# Patient Record
Sex: FEMALE | Race: WHITE | NOT HISPANIC OR LATINO | Employment: UNEMPLOYED | URBAN - METROPOLITAN AREA
[De-identification: names, ages, dates, MRNs, and addresses within clinical notes are randomized per-mention and may not be internally consistent; named-entity substitution may affect disease eponyms.]

---

## 2021-06-03 ENCOUNTER — RECORDS - HEALTHEAST (OUTPATIENT)
Dept: ADMINISTRATIVE | Facility: CLINIC | Age: 51
End: 2021-06-03

## 2023-08-10 ENCOUNTER — TELEPHONE (OUTPATIENT)
Dept: BEHAVIORAL HEALTH | Facility: CLINIC | Age: 53
End: 2023-08-10

## 2023-08-10 ENCOUNTER — HOSPITAL ENCOUNTER (INPATIENT)
Facility: CLINIC | Age: 53
LOS: 7 days | Discharge: PSYCHIATRIC OR SUBSTANCE USE FACILITY | End: 2023-08-23
Attending: FAMILY MEDICINE | Admitting: PSYCHIATRY & NEUROLOGY
Payer: MEDICAID

## 2023-08-10 DIAGNOSIS — G47.00 INSOMNIA, UNSPECIFIED TYPE: ICD-10-CM

## 2023-08-10 DIAGNOSIS — G25.81 RESTLESS LEGS SYNDROME: ICD-10-CM

## 2023-08-10 DIAGNOSIS — R06.02 SOB (SHORTNESS OF BREATH): ICD-10-CM

## 2023-08-10 DIAGNOSIS — F32.A DEPRESSION, UNSPECIFIED DEPRESSION TYPE: ICD-10-CM

## 2023-08-10 DIAGNOSIS — R45.850 HOMICIDAL IDEATION: ICD-10-CM

## 2023-08-10 DIAGNOSIS — R45.851 SUICIDAL IDEATION: ICD-10-CM

## 2023-08-10 DIAGNOSIS — F32.A DEPRESSION: ICD-10-CM

## 2023-08-10 DIAGNOSIS — E56.9 VITAMIN DEFICIENCY: ICD-10-CM

## 2023-08-10 DIAGNOSIS — K59.09 OTHER CONSTIPATION: ICD-10-CM

## 2023-08-10 DIAGNOSIS — Z20.822 CONTACT WITH AND (SUSPECTED) EXPOSURE TO COVID-19: Primary | ICD-10-CM

## 2023-08-10 DIAGNOSIS — B00.9 HERPES SIMPLEX VIRUS INFECTION: ICD-10-CM

## 2023-08-10 LAB
ALCOHOL BREATH TEST: 0.01 (ref 0–0.01)
AMPHETAMINES UR QL SCN: ABNORMAL
BARBITURATES UR QL SCN: ABNORMAL
BENZODIAZ UR QL SCN: ABNORMAL
BZE UR QL SCN: ABNORMAL
CANNABINOIDS UR QL SCN: ABNORMAL
OPIATES UR QL SCN: ABNORMAL

## 2023-08-10 PROCEDURE — 80307 DRUG TEST PRSMV CHEM ANLYZR: CPT | Performed by: FAMILY MEDICINE

## 2023-08-10 PROCEDURE — 99285 EMERGENCY DEPT VISIT HI MDM: CPT | Performed by: FAMILY MEDICINE

## 2023-08-10 PROCEDURE — 99285 EMERGENCY DEPT VISIT HI MDM: CPT | Mod: 25 | Performed by: FAMILY MEDICINE

## 2023-08-10 PROCEDURE — 90791 PSYCH DIAGNOSTIC EVALUATION: CPT

## 2023-08-10 PROCEDURE — 81001 URINALYSIS AUTO W/SCOPE: CPT | Performed by: FAMILY MEDICINE

## 2023-08-10 RX ORDER — HYDROXYZINE HYDROCHLORIDE 25 MG/1
25-50 TABLET, FILM COATED ORAL EVERY 6 HOURS PRN
Status: DISCONTINUED | OUTPATIENT
Start: 2023-08-10 | End: 2023-08-23 | Stop reason: HOSPADM

## 2023-08-10 RX ORDER — BUPROPION HYDROCHLORIDE 150 MG/1
150 TABLET, EXTENDED RELEASE ORAL DAILY
Status: DISCONTINUED | OUTPATIENT
Start: 2023-08-11 | End: 2023-08-23 | Stop reason: HOSPADM

## 2023-08-10 RX ORDER — QUETIAPINE FUMARATE 25 MG/1
25 TABLET, FILM COATED ORAL 2 TIMES DAILY PRN
Status: DISCONTINUED | OUTPATIENT
Start: 2023-08-10 | End: 2023-08-10

## 2023-08-10 RX ORDER — QUETIAPINE FUMARATE 100 MG/1
100 TABLET, FILM COATED ORAL AT BEDTIME
Status: DISCONTINUED | OUTPATIENT
Start: 2023-08-10 | End: 2023-08-15

## 2023-08-10 RX ORDER — ROPINIROLE 0.25 MG/1
0.25 TABLET, FILM COATED ORAL AT BEDTIME
Status: DISCONTINUED | OUTPATIENT
Start: 2023-08-10 | End: 2023-08-10

## 2023-08-10 RX ORDER — GABAPENTIN 300 MG/1
300 CAPSULE ORAL AT BEDTIME
Status: DISCONTINUED | OUTPATIENT
Start: 2023-08-10 | End: 2023-08-23 | Stop reason: HOSPADM

## 2023-08-10 ASSESSMENT — ACTIVITIES OF DAILY LIVING (ADL)
ADLS_ACUITY_SCORE: 35
ADLS_ACUITY_SCORE: 35

## 2023-08-11 ENCOUNTER — TELEPHONE (OUTPATIENT)
Dept: BEHAVIORAL HEALTH | Facility: CLINIC | Age: 53
End: 2023-08-11

## 2023-08-11 LAB
ALBUMIN SERPL BCG-MCNC: 3.9 G/DL (ref 3.5–5.2)
ALBUMIN UR-MCNC: NEGATIVE MG/DL
ALP SERPL-CCNC: 59 U/L (ref 35–104)
ALT SERPL W P-5'-P-CCNC: 26 U/L (ref 0–50)
ANION GAP SERPL CALCULATED.3IONS-SCNC: 8 MMOL/L (ref 7–15)
APPEARANCE UR: CLEAR
AST SERPL W P-5'-P-CCNC: 25 U/L (ref 0–45)
BASOPHILS # BLD AUTO: 0.1 10E3/UL (ref 0–0.2)
BASOPHILS NFR BLD AUTO: 1 %
BILIRUB SERPL-MCNC: 0.3 MG/DL
BILIRUB UR QL STRIP: NEGATIVE
BUN SERPL-MCNC: 15 MG/DL (ref 6–20)
CALCIUM SERPL-MCNC: 9.2 MG/DL (ref 8.6–10)
CHLORIDE SERPL-SCNC: 105 MMOL/L (ref 98–107)
COLOR UR AUTO: ABNORMAL
CREAT SERPL-MCNC: 1.02 MG/DL (ref 0.51–0.95)
DEPRECATED HCO3 PLAS-SCNC: 29 MMOL/L (ref 22–29)
EOSINOPHIL # BLD AUTO: 0.3 10E3/UL (ref 0–0.7)
EOSINOPHIL NFR BLD AUTO: 5 %
ERYTHROCYTE [DISTWIDTH] IN BLOOD BY AUTOMATED COUNT: 14.5 % (ref 10–15)
GFR SERPL CREATININE-BSD FRML MDRD: 66 ML/MIN/1.73M2
GLUCOSE SERPL-MCNC: 58 MG/DL (ref 70–99)
GLUCOSE UR STRIP-MCNC: NEGATIVE MG/DL
HCT VFR BLD AUTO: 42.6 % (ref 35–47)
HGB BLD-MCNC: 13.9 G/DL (ref 11.7–15.7)
HGB UR QL STRIP: NEGATIVE
IMM GRANULOCYTES # BLD: 0 10E3/UL
IMM GRANULOCYTES NFR BLD: 0 %
KETONES UR STRIP-MCNC: NEGATIVE MG/DL
LEUKOCYTE ESTERASE UR QL STRIP: ABNORMAL
LYMPHOCYTES # BLD AUTO: 3.6 10E3/UL (ref 0.8–5.3)
LYMPHOCYTES NFR BLD AUTO: 50 %
MCH RBC QN AUTO: 30.3 PG (ref 26.5–33)
MCHC RBC AUTO-ENTMCNC: 32.6 G/DL (ref 31.5–36.5)
MCV RBC AUTO: 93 FL (ref 78–100)
MONOCYTES # BLD AUTO: 0.7 10E3/UL (ref 0–1.3)
MONOCYTES NFR BLD AUTO: 10 %
MUCOUS THREADS #/AREA URNS LPF: PRESENT /LPF
NEUTROPHILS # BLD AUTO: 2.4 10E3/UL (ref 1.6–8.3)
NEUTROPHILS NFR BLD AUTO: 34 %
NITRATE UR QL: NEGATIVE
NRBC # BLD AUTO: 0 10E3/UL
NRBC BLD AUTO-RTO: 0 /100
PH UR STRIP: 5 [PH] (ref 5–7)
PLATELET # BLD AUTO: 389 10E3/UL (ref 150–450)
POTASSIUM SERPL-SCNC: 4.4 MMOL/L (ref 3.4–5.3)
PROT SERPL-MCNC: 6.5 G/DL (ref 6.4–8.3)
RBC # BLD AUTO: 4.58 10E6/UL (ref 3.8–5.2)
RBC URINE: 1 /HPF
SARS-COV-2 RNA RESP QL NAA+PROBE: NEGATIVE
SODIUM SERPL-SCNC: 142 MMOL/L (ref 136–145)
SP GR UR STRIP: 1.01 (ref 1–1.03)
SQUAMOUS EPITHELIAL: 1 /HPF
UROBILINOGEN UR STRIP-MCNC: NORMAL MG/DL
WBC # BLD AUTO: 7.1 10E3/UL (ref 4–11)
WBC URINE: 4 /HPF

## 2023-08-11 PROCEDURE — 250N000013 HC RX MED GY IP 250 OP 250 PS 637: Performed by: FAMILY MEDICINE

## 2023-08-11 PROCEDURE — 80053 COMPREHEN METABOLIC PANEL: CPT | Performed by: FAMILY MEDICINE

## 2023-08-11 PROCEDURE — 36415 COLL VENOUS BLD VENIPUNCTURE: CPT | Performed by: FAMILY MEDICINE

## 2023-08-11 PROCEDURE — 87635 SARS-COV-2 COVID-19 AMP PRB: CPT | Performed by: FAMILY MEDICINE

## 2023-08-11 PROCEDURE — 94640 AIRWAY INHALATION TREATMENT: CPT | Performed by: FAMILY MEDICINE

## 2023-08-11 PROCEDURE — 80179 DRUG ASSAY SALICYLATE: CPT | Performed by: EMERGENCY MEDICINE

## 2023-08-11 PROCEDURE — 80143 DRUG ASSAY ACETAMINOPHEN: CPT | Performed by: EMERGENCY MEDICINE

## 2023-08-11 PROCEDURE — 85025 COMPLETE CBC W/AUTO DIFF WBC: CPT | Performed by: FAMILY MEDICINE

## 2023-08-11 RX ORDER — MULTIPLE VITAMINS W/ MINERALS TAB 9MG-400MCG
1 TAB ORAL DAILY
Status: ON HOLD | COMMUNITY
End: 2023-08-22

## 2023-08-11 RX ADMIN — GABAPENTIN 300 MG: 300 CAPSULE ORAL at 21:51

## 2023-08-11 RX ADMIN — UMECLIDINIUM BROMIDE AND VILANTEROL TRIFENATATE 1 PUFF: 62.5; 25 POWDER RESPIRATORY (INHALATION) at 08:14

## 2023-08-11 RX ADMIN — QUETIAPINE FUMARATE 100 MG: 100 TABLET ORAL at 00:17

## 2023-08-11 RX ADMIN — QUETIAPINE FUMARATE 100 MG: 100 TABLET ORAL at 21:51

## 2023-08-11 RX ADMIN — GABAPENTIN 300 MG: 300 CAPSULE ORAL at 00:17

## 2023-08-11 RX ADMIN — BUPROPION HYDROCHLORIDE 150 MG: 150 TABLET, FILM COATED, EXTENDED RELEASE ORAL at 08:14

## 2023-08-11 ASSESSMENT — ACTIVITIES OF DAILY LIVING (ADL)
ADLS_ACUITY_SCORE: 35

## 2023-08-11 NOTE — ED NOTES
IP MH Referral Acuity Rating Score (RARS)    LMHP complete at referral to IP MH, with DEC; and, daily while awaiting IP MH placement. Call score to PPS.  CRITERIA SCORING   New 74 HH and Involuntary for IP MH (not adolescent) 0/1   Boarding over 24 hours 0/1   Vulnerable adult at least 55+ with multiple co morbidities; or, Patient age 11 or under 0/1   Suicide ideation without relief of precipitating factors 1/1   Current plan for suicide 0/1   Current plan for homicide 1/1   Imminent risk or actual attempt to seriously harm another without relief of factors precipitating the attempt 0/1   Severe dysfunction in daily living (ex: complete neglect for self care, extreme disruption in vegetative function, extreme deterioration in social interactions) 0/1   Recent (last 2 weeks) or current physical aggression in the ED 0/1   Restraints or seclusion episode in ED 0/1   Verbal aggression, agitation, yelling, etc., while in the ED 0/1   Active psychosis with psychomotor agitation or catatonia 0/1   Need for constant or near constant redirection (from leaving, from others, etc).  0/1   Intrusive or disruptive behaviors 0/1   TOTAL Acuity Total Score: 2
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
## 2023-08-11 NOTE — PHARMACY-ADMISSION MEDICATION HISTORY
"Pharmacist Admission Medication History    Admission medication history is complete. The information provided in this note is only as accurate as the sources available at the time of the update.    Medication reconciliation/reorder completed by provider prior to medication history? Yes    Information Source(s): Patient via in-person    Pertinent Information: Madison reported she takes a multivitamin at home and uses her mother's \"rescue inhaler\". She did not know the medication in the rescue inhaler. Madison has not taken prescriptions medications for a couple years due to her losing insurance coverage.    Changes made to PTA medication list:  Added: Unknown to patient -> rescue inhaler  Deleted: Wellbutrin, cholecalciferol, cyanocobalamin, gabapentin, hydroxyzine, Combivent Respimat, Lidoderm patch, Claritin, Seroquel, Requip, Senokot-S, Cepacol lozenges  Changed: None    Medication History Completed By: Maite Hughes RPH 8/11/2023 3:23 PM    Prior to Admission medications    Medication Sig Last Dose Taking? Auth Provider Long Term End Date   multivitamin w/minerals (THERA-VIT-M) tablet Take 1 tablet by mouth daily Past Week Yes Unknown, Entered By History     UNKNOWN TO PATIENT \"Rescue inhaler\" but patient did not know the name  Yes Unknown, Entered By History         "
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
## 2023-08-11 NOTE — CARE PLAN
08/10/23 2246   Care Path Handoff   Final Disposition / Recommended Care Path inpatient mental health   Clinical Substantiation Pt is experiencing increased depression with both homicidal and suicidal ideation.   Identified Goals and  Safety Issues Continued suicidal ideation and homicidal ideation   Designated Contact #1 Jo [phone]   Plan for Care reviewed with assigned Medical Provider yes   Plan for Care Team Review provider

## 2023-08-11 NOTE — ED TRIAGE NOTES
Triage Assessment       Row Name 08/10/23 1929       Triage Assessment (Adult)    Airway WDL WDL       Respiratory WDL    Respiratory WDL WDL       Skin Circulation/Temperature WDL    Skin Circulation/Temperature WDL WDL       Cardiac WDL    Cardiac WDL WDL       Peripheral/Neurovascular WDL    Peripheral Neurovascular WDL WDL       Cognitive/Neuro/Behavioral WDL    Cognitive/Neuro/Behavioral WDL WDL

## 2023-08-11 NOTE — TELEPHONE ENCOUNTER
R:    11:07a Intake received call from [facility]  ([facility]) informing that pt has been declined for admission d/t pt's level of care being best served in the [facility] d/t hx of ECT. [facility] would be unable to accommodate pt if the need were to arise. Pt also declined d/t hx of aggression and facility being capped for high acuity pt's. Intake notified pt's .

## 2023-08-11 NOTE — ED TRIAGE NOTES
"  Patient reports suicidal and homicidal ideation. Patient reports being homicidal towards a specific person \"who did her wrong\".  Patient denies having a plan to commit suicide. Daughter reports patient's mood has been really down lately.     Patient reports she has had ECT done here in the past.     Patient is currently homeless and has multiple bags of belongings with her.     Patient endorses alcohol and meth use.   "

## 2023-08-11 NOTE — ED NOTES
IP MH Referral Acuity Rating Score (RARS)    LMHP complete at referral to IP MH, with DEC; and, daily while awaiting IP MH placement. Call score to PPS.  CRITERIA SCORING   New 74 HH and Involuntary for IP MH (not adolescent) 0/1   Boarding over 24 hours 0/1   Vulnerable adult at least 55+ with multiple co morbidities; or, Patient age 11 or under 0/1   Suicide ideation without relief of precipitating factors 1/1   Current plan for suicide 0/1   Current plan for homicide 0/1   Imminent risk or actual attempt to seriously harm another without relief of factors precipitating the attempt 1/1   Severe dysfunction in daily living (ex: complete neglect for self care, extreme disruption in vegetative function, extreme deterioration in social interactions) 1/1   Recent (last 2 weeks) or current physical aggression in the ED 0/1   Restraints or seclusion episode in ED 0/1   Verbal aggression, agitation, yelling, etc., while in the ED 0/1   Active psychosis with psychomotor agitation or catatonia 0/1   Need for constant or near constant redirection (from leaving, from others, etc).  0/1   Intrusive or disruptive behaviors 0/1   TOTAL Acuity Total Score: 3     YOLY Belle

## 2023-08-11 NOTE — TELEPHONE ENCOUNTER
R: Bed search initiated @ 6:30AM, huber reviewed but declined @ 11:20AM due to acuity.  Intake called [facility] @ 11:33AM and Mary willing to review.  All clinical info gathered and faxed @ 12:02PM. Awaiting review determination.     [facility] called back @ 1:47pm and requests a UA, COVID, CBC, and CMP for further review.  Intake called [facility] ED @ 1:57pm requesting labs for further review @ [facility] .    Once labs result -  need to be faxed to [facility].

## 2023-08-11 NOTE — ED PROVIDER NOTES
"ED Provider Note  [facility]      History     Chief Complaint   Patient presents with    Suicidal    Homicidal     HI towards specific person      HPI  Madison Abrams is a 53 year old female with PMH of hallucinations 2/2 meth use disorder, pulmonary emphysema 2/2 COPD, and MDD who presents to the ED with SI and HI. She reports being homicidal towards a specific person \"who did her wrong\". She denies having a plan to commit suicide. Daughter reports patient's mood has been really down lately. She notes she has had ECT done here in the past. She is currently homeless and has multiple bags of belongings with her. Patient endorses alcohol and meth use.  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  Patient is feeling like she would try and kill this person and has thoughts of having the  kill her.    Past Medical History  Past Medical History:   Diagnosis Date    ADD (attention deficit disorder with hyperactivity)     Anxiety     Bipolar affective disorder (H)     Chemical dependency (H)     COPD with emphysema (H)     Depressive disorder     H/O recurrent vertebral fractures     3x all assoicated with significant mechanism of injury    Spontaneous pneumothorax      Past Surgical History:   Procedure Laterality Date     SECTION      KNEE SURGERY      LAPAROSCOPIC TUBAL LIGATION       buPROPion (WELLBUTRIN SR) 150 MG 12 hr tablet  cholecalciferol 1000 UNITS TABS  Cyanocobalamin (VITAMIN  B-12) 2500 MCG tablet  gabapentin (NEURONTIN) 300 MG capsule  hydrOXYzine (ATARAX) 25 MG tablet  Ipratropium-Albuterol (COMBIVENT RESPIMAT)  MCG/ACT inhaler  lidocaine (LIDODERM) 5 % patch  loratadine (CLARITIN) 10 MG tablet  QUEtiapine (SEROQUEL) 100 MG tablet  QUEtiapine (SEROQUEL) 25 MG tablet  rOPINIRole " (REQUIP) 0.25 MG tablet  senna-docusate (SENOKOT-S;PERICOLACE) 8.6-50 MG per tablet  Throat Lozenges (CEPACOL MT)      No Known Allergies  Family History  No family history on file.  Social History   Social History     Tobacco Use    Smoking status: Every Day     Packs/day: 0.10     Years: 29.00     Pack years: 2.90     Types: Cigarettes    Smokeless tobacco: Never   Substance Use Topics    Alcohol use: Yes     Alcohol/week: 0.8 standard drinks of alcohol     Types: 1 drink(s) per week     Comment: Previously completely Sober 3/13-8/14.      Drug use: Yes     Frequency: 1.0 times per week     Types: Methamphetamines         A medically appropriate review of systems was performed with pertinent positives and negatives noted in the HPI, and all other systems negative.    Physical Exam   BP: 113/73  Pulse: 92  Temp: 98.6  F (37  C)  Resp: 18  Height: 152.4 cm (5')  Weight: 41.7 kg (92 lb)  SpO2: 93 %  Physical Exam  Constitutional:       General: She is not in acute distress.     Appearance: Normal appearance. She is not diaphoretic.   HENT:      Head: Atraumatic.      Mouth/Throat:      Mouth: Mucous membranes are moist.   Eyes:      General: No scleral icterus.     Conjunctiva/sclera: Conjunctivae normal.   Cardiovascular:      Rate and Rhythm: Normal rate.      Heart sounds: Normal heart sounds.   Pulmonary:      Effort: No respiratory distress.      Breath sounds: Normal breath sounds.   Abdominal:      General: Abdomen is flat.   Musculoskeletal:      Cervical back: Neck supple.   Skin:     General: Skin is warm.      Findings: No rash.   Neurological:      General: No focal deficit present.      Mental Status: She is alert and oriented to person, place, and time.      Sensory: No sensory deficit.      Motor: No weakness.      Coordination: Coordination normal.   Psychiatric:         Mood and Affect: Mood is anxious and depressed. Affect is tearful.         Speech: Speech normal.         Thought Content: Thought  content includes homicidal and suicidal ideation. Thought content includes suicidal plan.           ED Course, Procedures, & Data      Procedures     Medications - No data to display  Labs Ordered and Resulted from Time of ED Arrival to Time of ED Departure   DRUG ABUSE SCREEN 1 URINE (ED) - Abnormal       Result Value    Amphetamines Urine Screen Positive (*)     Barbituates Urine Screen Negative      Benzodiazepine Urine Screen Negative      Cannabinoids Urine Screen Negative      Cocaine Urine Screen Negative      Opiates Urine Screen Negative     ALCOHOL BREATH TEST POCT - Abnormal    Alcohol Breath Test 0.011 (*)      No orders to display          Critical care was not performed.     Medical Decision Making  The patient's presentation was of high complexity (a chronic illness severe exacerbation, progression, or side effect of treatment).    The patient's evaluation involved:  ordering and/or review of 1 test(s) in this encounter (see separate area of note for details)  discussion of management or test interpretation with another health professional (patient discussed with independent provider DEC  with reference to hospitalization versus outpatient management at this time patient is acutely in need of stabilization and will be hospitalized.)    The patient's management necessitated high risk with discussion of hospitalization.    Assessment & Plan        I have reviewed the nursing notes. I have reviewed the findings, diagnosis, plan and need for follow up with the patient.    Patient with severe depression no suicidal homicidal ideation plan to have herself killed by police men after trying to kill an individual.  Patient at this time will be a voluntary admission for stabilization and treatment.    Final diagnoses:   Suicidal ideation   Homicidal ideation   Depression, unspecified depression type       [facility] EMERGENCY DEPARTMENT  8/10/2023     Ochoa Xavier MD  08/10/23  [id number]

## 2023-08-11 NOTE — ED NOTES
1615 53 yof received to [facility]. VSS Alert and oriented . Poor eye contact . Irritable. States it is supposed to be quieter over here and she is very upset. SI-Yes w/o plan  HI-Yes but w/o plan for someone specific outside the hospital. SIB- No AVH- no Sleep - I slept all day while here but otherwise,I do not sleepI have slept only 3 days in the last 2 months. . Meds- no meds /no side effects, Drugs - Yes , I do meth every day for 3 years  to get moving  and get the fog out of my brain. Yesterday was the last time. I also drink 2 shots of Alcohol per day . Last time was today. Mood - Very upset. I am angry and the triggers are everything about my life. I don't know what else. Unit orientation given . Pt went to sleep on the gurney in the milieu.

## 2023-08-11 NOTE — ED PROVIDER NOTES
[facility] ED Mental Health Handoff Note:       Brief HPI:  This is a 53 year old female signed out to me.  See initial ED Provider note for full details of the presentation. Interval history is pertinent for continued suicidal ideation will be starting back on medications..    Home meds reviewed and ordered/administered: Yes    Medically stable for inpatient mental health admission: Yes.    Evaluated by mental health: Yes. The recommendation is for inpatient mental health treatment. Bed search in process    Safety concerns: At the time I received sign out, there were no safety concerns.    Hold Status:  Active Orders   N/A           Exam:   Patient Vitals for the past 24 hrs:   BP Temp Temp src Pulse Resp SpO2 Height Weight   08/10/23 1926 113/73 98.6  F (37  C) Oral 92 18 93 % 1.524 m (5') 41.7 kg (92 lb)           ED Course:    Medications   buPROPion (WELLBUTRIN SR) 12 hr tablet 150 mg (150 mg Oral $Given 8/11/23 0814)   gabapentin (NEURONTIN) capsule 300 mg (300 mg Oral $Given 8/11/23 0017)   hydrOXYzine (ATARAX) tablet 25-50 mg (has no administration in time range)   umeclidinium-vilanterol (ANORO ELLIPTA) 62.5-25 MCG/ACT oral inhaler 1 puff (1 puff Inhalation $Given 8/11/23 0814)   QUEtiapine (SEROquel) tablet 100 mg (100 mg Oral $Given 8/11/23 0017)            There were no significant events during my shift.    Patient was signed out to the oncoming provider, Dr. Mason      Impression:    ICD-10-CM    1. Suicidal ideation  R45.851       2. Homicidal ideation  R45.850       3. Depression, unspecified depression type  F32.A           Plan:    Awaiting inpatient mental health admission/transfer.      RESULTS:   Results for orders placed or performed during the hospital encounter of 08/10/23 (from the past 24 hour(s))   [facility] Assessment Consult Order:     Status: None ()    Collection Time: 08/10/23  8:34 PM    Leonor Cr, ENRICOSW     8/10/2023 10:46  PM  Diagnostic Evaluation Consultation  Crisis Assessment    Patient Name: Madison Abrams  Age:  53 year old  Legal Sex: female  Gender Identity: female  Pronouns:   Race: White  Ethnicity: Not  or   Language: English      Patient was assessed: Virtual: iPad Telemedicine Start Time: 2152   Telemedicine Stop Time: 2225  Patient location: [facility] EMERGENCY DEPARTMENT                             URE-    Referral Data and Chief Complaint  Madison Abrams presents to the ED with family/friends. Patient is   presenting to the ED for the following concerns: Depression,   Suicidal ideation.   Factors that make the mental health crisis   life threatening or complex are:  Recent loss, homelessness,   suicidal ideation.      Informed Consent and Assessment Methods  Explained the crisis assessment process, including applicable   information disclosures and limits to confidentiality, assessed   understanding of the process, and obtained consent to proceed   with the assessment.  Assessment methods included conducting a   formal interview with patient, review of medical records,   collaboration with medical staff, and obtaining relevant   collateral information from family and community providers when   available.  : done     Patient response to interventions: acceptance expressed  Coping skills were attempted to reduce the crisis:  talking with   her children     History of the Crisis   Patient reports a long mental health history with several   inpatient hospitalizations and ECT treatment. Patient reports   making a commitment to her children that if her depression yeah   to a level where it has been previously he would heed their   advice and come into the hospital. Patient has recently been   staying with her daughter and with her daughter observing her for   several days she encouraged pt to come to the hospital to seek   help. Patient has been experiencing frequent suicidal ideation   for the  past two months and resent homicidal ideation towards a   woman that she perceives as wronged her. Patient will not name   this woman or give her location but she is adamant that if she   was to encounter this woman she would kill her and and end her   life with suicide by . Patient reports  I can be in retirement or   in the grave it doesn't matter to me.  patient reports that her   thoughts have become more intense since the disappearance of her   boyfriend one month ago. Patient states that her boyfriend was   dropped off in [place] to work but has not been seen since he was   dropped off. Patient reports that because of the boyfriend's   dealings with this woman he has a warrant out for his arrest and   before he left he expressed to patient that he was suicidal.   Patient believes that her boyfriend has ended his life and she   holds this woman responsible. 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   Patient endorses feelings of hopelessness, high anxiety,   depressed mood, agitation and concerns with her appetite and   sleep.    Brief Psychosocial History  Family:   , Children yes  Support System:  Parent(s), Children  Employment Status:  unemployed  Source of Income:  none  Financial Environmental Concerns:  unable to afford   medication(s), unable to afford food, unemployed, insurance, none  Current Hobbies:  other (see comments), music, arts/crafts (Race   horses)  Barriers in Personal Life:  financial concerns, lack of   motivation    Significant Clinical History  Current Anxiety Symptoms:     Current Depression/Trauma:  withdrawl/isolation, crying or feels   like crying, hoplessness, sadness, thoughts of death/suicide  Current Somatic Symptoms:  racing thoughts, shortness of breath   or racing heart, anxious, sweating, flushing, shaking  Current Psychosis/Thought Disturbance:   agitation  Current Eating Symptoms:  loss of appetite  Chemical Use History:  Alcohol: Other (comments) (A few times a   week.)  Last Use:: 08/10/23  Benzodiazepines: None  Opiates: None  Cocaine: None  Marijuana: None  Last Use:: 08/10/23  Other Use: Methamphetamines  Last Use:: 08/10/23   Past diagnosis:  Depression, Anxiety Disorder, Bipolar Disorder  Family history:  Death by Suicide, Suicide Attempt  Past treatment:  Inpatient Hospitalization, Individual therapy,   Psychiatric Medication Management  Details of most recent treatment:     Other relevant history:          Collateral Information  Is there collateral information: No (Writer attempted to reach   her daughter for collateral)     Collateral information name, relationship, phone number:       What happened today:       What is different about patient's functioning:       Concern about alcohol/drug use:      What do you think the patient needs:      Has patient made comments about wanting to kill   themselves/others:      If d/c is recommended, can they take part in safety/aftercare   planning:       Additional collateral information:        Risk Assessment  [place] Suicide Severity Rating Scale Full Clinical Version:  Suicidal Ideation  Q1 Wish to be Dead (Lifetime): Yes  Q2 Non-Specific Active Suicidal Thoughts (Lifetime): Yes  3. Active Suicidal Ideation with any Methods (Not Plan) Without   Intent to Act (Lifetime): Yes  Q4 Active Suicidal Ideation with Some Intent to Act, Without   Specific Plan (Lifetime): Yes  Q5 Active Suicidal Ideation with Specific Plan and Intent   (Lifetime): Yes  Q6 Suicide Behavior (Lifetime): yes     Suicidal Behavior (Lifetime)  Actual Attempt (Lifetime): Yes  Total Number of Actual Attempts (Lifetime): 10  Actual Attempt Description (Lifetime): Various ways OD, cut   wrist, gun and driving off road  Has subject engaged in non-suicidal self-injurious behavior?   (Lifetime): No  Interrupted Attempts (Lifetime):  No  Aborted or Self-Interrupted Attempt (Lifetime): No  Preparatory Acts or Behavior (Lifetime): Yes  Total Number of Preparatory Acts (Lifetime): 5    [place] Suicide Severity Rating Scale Recent:   Suicidal Ideation (Recent)  Q1 Wished to be Dead (Past Month): yes  Q2 Suicidal Thoughts (Past Month): yes  Q3 Suicidal Thought Method: no  Q4 Suicidal Intent without Specific Plan: yes  Q5 Suicide Intent with Specific Plan: no  Level of Risk per Screen: high risk  Intensity of Ideation (Recent)  Frequency (Past 1 Month): Many times each day  Duration (Past 1 Month): 4-8 hours/most of day  Controllability (Past 1 Month): Unable to control thoughts  Deterrents (Past 1 Month): Uncertain that deterrents stopped you  Reasons for Ideation (Past 1 Month): Completely to end or stop   the pain (You couldn't go on living with the pain or how you were   feeling)  Suicidal Behavior (Recent)  Actual Attempt (Past 3 Months): No  Total Number of Actual Attempts (Past 3 Months): 0  Has subject engaged in non-suicidal self-injurious behavior?   (Past 3 Months): No  Interrupted Attempts (Past 3 Months): No  Aborted or Self-Interrupted Attempt (Past 3 Months): No  Preparatory Acts or Behavior (Past 3 Months): No    Environmental or Psychosocial Events: ongoing abuse of   substances, loss of a loved one, work or task failure, unstable   housing, homelessness  Protective Factors: Protective Factors: strong bond to family   unit, community support, or employment, cultural, spiritual , or   Muslim beliefs associated with meaning and value in life, help   seeking    Does the patient have thoughts of harming others? Feels Like   Hurting Others: yes  Previous Attempt to Hurt Others: no  Current presentation: Verbal Threats  Violence Threats in Past 6 Months: She plans to harm a women that   she reports ruined her business  Current Violence Plan or Thoughts: Plans to harm a woman that she   feels wronged her.  Is the patient engaging in  sexually inappropriate behavior?: no  Duty to warn initiated: no  Duty to warn details: Pt will not give the information for the   woman she wanted to harm.    Is the patient engaging in sexually inappropriate behavior?  no          Mental Status Exam   Affect: Blunted, Flat  Appearance: Appropriate  Attention Span/Concentration: Attentive  Eye Contact: Engaged    Fund of Knowledge: Appropriate   Language /Speech Content: Fluent  Language /Speech Volume: Normal  Language /Speech Rate/Productions: Normal  Recent Memory: Intact  Remote Memory: Intact  Mood: Irritable, Sad, Depressed  Orientation to Person: Yes   Orientation to Place: Yes  Orientation to Time of Day: Yes  Orientation to Date: Yes     Situation (Do they understand why they are here?): Yes  Psychomotor Behavior: Normal  Thought Content: Suicidal, Homicidal  Thought Form: Intact     Mini-Cog Assessment  Number of Words Recalled:    Clock-Drawing Test:     Three Item Recall:    Mini-Cog Total Score:       Medication  Psychotropic medications:   Medication Orders - Psychiatric (From admission, onward)      Start     Dose/Rate Route Frequency Ordered Stop    08/11/23 0800  buPROPion (WELLBUTRIN SR) 12 hr tablet 150 mg           150 mg Oral DAILY 08/10/23 2233      08/10/23 2235  QUEtiapine (SEROquel) tablet 100 mg         100 mg Oral AT BEDTIME 08/10/23 2233      08/10/23 2232  QUEtiapine (SEROquel) tablet 25 mg         25 mg Oral 2 TIMES DAILY PRN 08/10/23 2233      08/10/23 2232  hydrOXYzine (ATARAX) tablet 25-50 mg         25-50 mg Oral EVERY 6 HOURS PRN 08/10/23 2233               Current Care Team  Patient Care Team:  Erik Cason MD as PCP - General (Internal Medicine)    Diagnosis  Patient Active Problem List   Diagnosis Code    Alcohol withdrawal (H) F10.939    Mood change R45.86    Depression F32.A    CARDIOVASCULAR SCREENING; LDL GOAL LESS THAN 160 Z13.6    Substance abuse (H) F19.10    Fatigue R53.83    Herpes simplex B00.9       Primary  Problem This Admission  Active Hospital Problems    *Depression        Clinical Summary and Substantiation of Recommendations   As patients continues to endorse both suicidal ideation and   homicidal ideation she will be hospitalized for stabilization.   Patient has not engaged with mental health providers in several   years and has been using substances in place of appropriate   supports. At the encouragement of her family pt is willing to   engage with treatment but will require hospitalization for her   safety. Pt's depressive symptoms have come to a level that she is   no longer able to remain safe and appropriate in the community.   Pt also is at a higher risk for harm as she has a family hx of   suicide.       Imminent risk of harm: Homicidal Thoughts or Behaviors  Severe psychiatric, behavioral or other comorbid conditions are   appropriate for management at inpatient mental health as   indicated by at least one of the following: Psychiatric Symptoms  Severe dysfunction in daily living is present as indicated by at   least one of the following: Complete inability to maintain any   appropriate aspect of personal responsibility in any adult roles  Situation and expectations are appropriate for inpatient care:   Biopsychosocial stresses potentially contributing to clinical   presentation (co morbidities) have been assessed and are absent   or manageable at proposed level of care  Inpatient mental health services are necessary to meet patient   needs and at least one of the following: Specific condition   related to admission diagnosis is present and judged likely to   further improve at proposed level of care      Patient coping skills attempted to reduce the crisis:  talking   with her children    Disposition  Recommended disposition: Inpatient Mental Health        Reviewed case and recommendations with attending provider.   Attending Name: Morentia Mi       Attending concurs with disposition: yes        Patient and/or validated legal guardian concurs with disposition:     yes       Final disposition:  inpatient mental health    Legal status on admission: Voluntary/Patient has signed consent   for treatment    Assessment Details   Total duration spent on the patient case in minutes: 30 min     CPT code(s) utilized: 13305 - Psychotherapy for Crisis - 60   (30-74*) min    Leonor Shrestha [facility], Psychotherapist  [facility] - Triage & Transition Services  Callback: [phone]           Urine Drugs of Abuse Screen     Status: Abnormal    Collection Time: 08/10/23  9:24 PM    Narrative    The following orders were created for panel order Urine Drugs of Abuse Screen.  Procedure                               Abnormality         Status                     ---------                               -----------         ------                     Drug abuse screen 1 urin...[[id number]]  Abnormal            Final result                 Please view results for these tests on the individual orders.   Drug abuse screen 1 urine (ED)     Status: Abnormal    Collection Time: 08/10/23  9:24 PM   Result Value Ref Range    Amphetamines Urine Screen Positive (A) Screen Negative    Barbituates Urine Screen Negative Screen Negative    Benzodiazepine Urine Screen Negative Screen Negative    Cannabinoids Urine Screen Negative Screen Negative    Cocaine Urine Screen Negative Screen Negative    Opiates Urine Screen Negative Screen Negative   UA with Microscopic reflex to Culture     Status: Abnormal    Collection Time: 08/10/23  9:24 PM    Specimen: Urine, Midstream   Result Value Ref Range    Color Urine Straw Colorless, Straw, Light Yellow, Yellow    Appearance Urine Clear Clear    Glucose Urine Negative Negative mg/dL    Bilirubin Urine Negative Negative    Ketones Urine Negative Negative mg/dL    Specific Gravity Urine 1.006 1.003 - 1.035    Blood Urine Negative Negative    pH Urine 5.0 5.0 - 7.0    Protein Albumin Urine Negative Negative  mg/dL    Urobilinogen Urine Normal Normal, 2.0 mg/dL    Nitrite Urine Negative Negative    Leukocyte Esterase Urine Small (A) Negative    Mucus Urine Present (A) None Seen /LPF    RBC Urine 1 <=2 /HPF    WBC Urine 4 <=5 /HPF    Squamous Epithelials Urine 1 <=1 /HPF    Narrative    Urine Culture not indicated   Alcohol breath test POCT     Status: Abnormal    Collection Time: 08/10/23  9:26 PM   Result Value Ref Range    Alcohol Breath Test 0.011 (A) 0.00 - 0.01   Asymptomatic COVID-19 Virus (Coronavirus) by PCR Nasopharyngeal     Status: Normal    Collection Time: 08/11/23  2:30 PM    Specimen: Nasopharyngeal; Swab   Result Value Ref Range    SARS CoV2 PCR Negative Negative    Narrative    Testing was performed using the Xpert Xpress SARS-CoV-2 Assay on the Cepheid Gene-Xpert Instrument Systems. Additional information about this Emergency Use Authorization (EUA) assay can be found via the Lab Guide. This test should be ordered for the detection of SARS-CoV-2 in individuals who meet SARS-CoV-2 clinical and/or epidemiological criteria as well as from individuals without symptoms or other reasons to suspect COVID-19. Test performance for asymptomatic patients has only been established in anterior nasal swab specimens. This test is for in vitro diagnostic use under the FDA EUA for laboratories certified under CLIA to perform high complexity testing. This test has not been FDA cleared or approved. A negative result does not rule out the presence of PCR inhibitors in the specimen or target RNA concentration below the limit of detection for the assay. The possibility of a false negative should be considered if the patient's recent exposure or clinical presentation suggests COVID-19. This test was validated by the [facility]. This laboratory is certified under the Clinical Laboratory Improvement Amendments (CLIA) as qualified to perform high complexity laboratory testing.     CBC with  platelets differential     Status: None    Collection Time: 08/11/23  2:31 PM    Narrative    The following orders were created for panel order CBC with platelets differential.  Procedure                               Abnormality         Status                     ---------                               -----------         ------                     CBC with platelets and d...[[id number]]                      Final result                 Please view results for these tests on the individual orders.   Comprehensive metabolic panel     Status: Abnormal    Collection Time: 08/11/23  2:31 PM   Result Value Ref Range    Sodium 142 136 - 145 mmol/L    Potassium 4.4 3.4 - 5.3 mmol/L    Chloride 105 98 - 107 mmol/L    Carbon Dioxide (CO2) 29 22 - 29 mmol/L    Anion Gap 8 7 - 15 mmol/L    Urea Nitrogen 15.0 6.0 - 20.0 mg/dL    Creatinine 1.02 (H) 0.51 - 0.95 mg/dL    Calcium 9.2 8.6 - 10.0 mg/dL    Glucose 58 (L) 70 - 99 mg/dL    Alkaline Phosphatase 59 35 - 104 U/L    AST 25 0 - 45 U/L    ALT 26 0 - 50 U/L    Protein Total 6.5 6.4 - 8.3 g/dL    Albumin 3.9 3.5 - 5.2 g/dL    Bilirubin Total 0.3 <=1.2 mg/dL    GFR Estimate 66 >60 mL/min/1.73m2   CBC with platelets and differential     Status: None    Collection Time: 08/11/23  2:31 PM   Result Value Ref Range    WBC Count 7.1 4.0 - 11.0 10e3/uL    RBC Count 4.58 3.80 - 5.20 10e6/uL    Hemoglobin 13.9 11.7 - 15.7 g/dL    Hematocrit 42.6 35.0 - 47.0 %    MCV 93 78 - 100 fL    MCH 30.3 26.5 - 33.0 pg    MCHC 32.6 31.5 - 36.5 g/dL    RDW 14.5 10.0 - 15.0 %    Platelet Count 389 150 - 450 10e3/uL    % Neutrophils 34 %    % Lymphocytes 50 %    % Monocytes 10 %    % Eosinophils 5 %    % Basophils 1 %    % Immature Granulocytes 0 %    NRBCs per 100 WBC 0 <1 /100    Absolute Neutrophils 2.4 1.6 - 8.3 10e3/uL    Absolute Lymphocytes 3.6 0.8 - 5.3 10e3/uL    Absolute Monocytes 0.7 0.0 - 1.3 10e3/uL    Absolute Eosinophils 0.3 0.0 - 0.7 10e3/uL    Absolute Basophils 0.1 0.0 - 0.2 10e3/uL     Absolute Immature Granulocytes 0.0 <=0.4 10e3/uL    Absolute NRBCs 0.0 10e3/uL             MD Morenita Ríos Eric Girard, MD  08/11/23 [id number]

## 2023-08-12 ENCOUNTER — TELEPHONE (OUTPATIENT)
Dept: BEHAVIORAL HEALTH | Facility: CLINIC | Age: 53
End: 2023-08-12
Payer: MEDICAID

## 2023-08-12 LAB
APAP SERPL-MCNC: <5 UG/ML (ref 10–30)
SALICYLATES SERPL-MCNC: <0.3 MG/DL

## 2023-08-12 PROCEDURE — 250N000013 HC RX MED GY IP 250 OP 250 PS 637: Performed by: EMERGENCY MEDICINE

## 2023-08-12 PROCEDURE — 250N000013 HC RX MED GY IP 250 OP 250 PS 637: Performed by: FAMILY MEDICINE

## 2023-08-12 RX ADMIN — NICOTINE POLACRILEX 4 MG: 4 GUM, CHEWING BUCCAL at 21:24

## 2023-08-12 RX ADMIN — BUPROPION HYDROCHLORIDE 150 MG: 150 TABLET, FILM COATED, EXTENDED RELEASE ORAL at 08:59

## 2023-08-12 RX ADMIN — GABAPENTIN 300 MG: 300 CAPSULE ORAL at 21:10

## 2023-08-12 RX ADMIN — QUETIAPINE FUMARATE 100 MG: 100 TABLET ORAL at 21:10

## 2023-08-12 ASSESSMENT — ACTIVITIES OF DAILY LIVING (ADL)
ADLS_ACUITY_SCORE: 35

## 2023-08-12 NOTE — ED PROVIDER NOTES
[facility] ED Mental Health Handoff Note:       Brief HPI:  This is a 53 year old female signed out to me by the previous provider.  See initial ED Provider note for full details of the presentation.     Home meds reviewed and ordered/administered: Yes    Medically stable for inpatient mental health admission: Yes.    Evaluated by mental health: Yes. The recommendation is for inpatient mental health treatment. Bed search in process    Safety concerns: At the time I received sign out, there were no safety concerns.    Hold Status:  Active Orders   N/A       Exam:   Patient Vitals for the past 24 hrs:   BP Temp Temp src Pulse Resp   08/11/23 1645 94/64 97.9  F (36.6  C) Oral 81 17       ED Course:    Medications   buPROPion (WELLBUTRIN SR) 12 hr tablet 150 mg (150 mg Oral $Given 8/11/23 0814)   gabapentin (NEURONTIN) capsule 300 mg (300 mg Oral $Given 8/11/23 2151)   hydrOXYzine (ATARAX) tablet 25-50 mg (has no administration in time range)   umeclidinium-vilanterol (ANORO ELLIPTA) 62.5-25 MCG/ACT oral inhaler 1 puff (1 puff Inhalation $Given 8/11/23 0814)   QUEtiapine (SEROquel) tablet 100 mg (100 mg Oral $Given 8/11/23 2151)       There no significant events during my shift.    Patient was signed out to the oncoming provider.    Impression:    ICD-10-CM    1. Suicidal ideation  R45.851       2. Homicidal ideation  R45.850       3. Depression, unspecified depression type  F32.A           Plan:    Awaiting inpatient mental health admission/transfer.          MD Luis Felipe Avelar Jill C, MD  08/12/23 7710

## 2023-08-12 NOTE — ED NOTES
Pt sleeping most of the night. Breathing w/o difficulty and turning self in bed routinely. No distress or c/o this night.

## 2023-08-12 NOTE — TELEPHONE ENCOUNTER
R: [place]  Access Inpatient Bed Call Log 8/11/23 4:48 PM    Intake has called facilities that have not updated the15 bed status within the last 12 hours.                           [facility] is at capacity.            [facility] is posting 0 beds. [phone]. Per call @4:15PM to Bernabe no bed availability or pending discharges.   [facility] is posting 0 beds. Negative covid required.               [facility] is posting 0 beds. Negative covid required. [phone]. Per call @4:49 PM no bed availability.   [facility] is posting 0 beds.      [facility] is posting 0 beds. [phone]        [facility] is posting 0 beds         [facility] ([facility]) is posting 1 beds.      [facility] is posting 2 beds. Low acuity only.        [facility] is posting 9 beds. LOW acuity ONLY. Mixed unit 12+. Negative covid- [phone].     [facility] has 2 bed posted. No aggression. Negative Covid. Low acuity.   [facility] is posting 0 beds. Negative covid. [phone]     [facility] ([place]) 1 bed. Low acuity only. Negative covid.  [phone].     [facility] is posting 2 beds. Low acuity. No current aggression.    [facility] ([place]) is posting 0 beds Low acuity only. Negative covid.  [phone].      [facility] is posting 1 bed. Low acuity. 72 HH hold preferred. Negative covid required. [phone]   [facility] ([place]) 5 bed. Low acuity only. Negative covid. - [phone].   [facility] [place] is posting 1 bed.  Negative covid required.   Vol only, No history of aggression, violence, or assault. No sexual offenders. No 72 HH holds.   [phone]. Per call @4:52 PM No answer   [facility] is posting 6 beds. Negative covid required.  (Must have the cognitive ability to do programming. No aggressive or violent behavior or recent HX in the last 2 yrs.  MH must be primary.) Always low acuity. [phone].    [facility] has 2 bed posted. Negative covid required.  Low acuity only. Violence and aggression capped.  [phone].    [facility] is posting 0 bed. Low acuity, Negative covid required.  [phone].     [facility] is posting 2 beds. Negative covid required.  Vol only. Combined adolescent and adult unit. No aggressive or violent behavior. No registered sex offenders.  [phone] Per call @4:54 PM to [facility] beds are available.   [facility] 1 bed Negative covid required.  [phone].     [facility] [place] is posting 0 beds. Negative covid. (No lines, drains, or tubes, oxygen, CPAP, IV, etc.). [phone].    [facility] ([facility]) is posting 5 beds. Negative covid. (No. lines, drains, or tubes, oxygen, CPAP, IV, etc.).   [phone] Per call @ 4:55 PM beds are available.          Pt remains on the work list pending appropriate bed availability.       10:02pm- [facility] declines due to acuity.  They do not have any available beds at other locations.

## 2023-08-12 NOTE — TELEPHONE ENCOUNTER
No appropriate beds are currently available within the  system. Bed search update (Statewide) @ 1:15AM:       [facility]: @ cap per website. Per Bernabe @ 01:20, they do not have beds available  [facility]: Per Philly @ 01:18, they only have low acuity beds in [place] (No psychosis, alejandro, aggression, HI). Pt not appropriate for current beds available  [facility]: @ cap per website. Per Tha @ 01:20, suggests calling back later this morning  [facility]: @ cap per website  [facility]: @ cap per website  [place]: @ cap per website  [facility]: Posting 9 beds. Mixed unit/Low acuity only. Per Aiyana @ 02:06, they are able to review but require a tylenol and salicylate be completed   [facility]: @ cap per website  [facility]: Does not review after 10PM.    [facility]: Declined 8/11 d/t acuity  [facility]: @ cap per website  [facility] [facility]: Posting 6 beds (Low acuity only. Must have the cognitive ability to do programming. No aggressive or violent behavior or recent HX in the last 2 yrs. MH must be primary). Pt not appropriate d/t reporting HI  [facility]: Posting 2 beds. Per Joseph @ 00:12, low acuity only.   [facility]: @ cap per website. Per Rachel @ 01:24 they only have low acuity female beds and she is currently reviewing 2 other referrals - suggests calling back later if needing review  [facility]: Posting 2 beds (Covid neg. Voluntary only. Mixed unit. No aggressive or violent behavior. No registered sex offenders).  [facility]: @ cap per website  [facility]: Posting 14 beds. Facility is in [place]. Out of state  [facility]: Posting 5 beds (Mixed unit/Low acuity). Per Christina @ 01:25, they are screening referrals and they do not have beds on their high acuity unit       Called ED MD @ 02:11 to request tylenol and aspirin be ordered as add-on for [place] to review    Labs resulted @  05:41 - Clinical was faxed to [facility] for review @ 05:43     Awaiting callback from [facility]

## 2023-08-12 NOTE — ED NOTES
I received report on this patient and will take over care.Patient resting in bed eyes closed chest rise and fall observed.8:35 am Patient to use restroom even gait and balance observed. Speech is clear and soft with good eye contact.9:05 am Patient has no shaking and no sweats no tremors.9:58 am patient doing well resting in bed eyes open TV on.Patient has been in room most of shift calm and relaxed resting she can voice her needs.
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
## 2023-08-13 ENCOUNTER — TELEPHONE (OUTPATIENT)
Dept: BEHAVIORAL HEALTH | Facility: CLINIC | Age: 53
End: 2023-08-13
Payer: MEDICAID

## 2023-08-13 PROCEDURE — 250N000013 HC RX MED GY IP 250 OP 250 PS 637: Performed by: FAMILY MEDICINE

## 2023-08-13 PROCEDURE — 250N000013 HC RX MED GY IP 250 OP 250 PS 637: Performed by: EMERGENCY MEDICINE

## 2023-08-13 RX ORDER — ALBUTEROL SULFATE 90 UG/1
2 AEROSOL, METERED RESPIRATORY (INHALATION) 4 TIMES DAILY
Status: DISCONTINUED | OUTPATIENT
Start: 2023-08-13 | End: 2023-08-23 | Stop reason: HOSPADM

## 2023-08-13 RX ADMIN — ALBUTEROL SULFATE 2 PUFF: 90 AEROSOL, METERED RESPIRATORY (INHALATION) at 21:04

## 2023-08-13 RX ADMIN — GABAPENTIN 300 MG: 300 CAPSULE ORAL at 21:04

## 2023-08-13 RX ADMIN — QUETIAPINE FUMARATE 100 MG: 100 TABLET ORAL at 21:04

## 2023-08-13 RX ADMIN — BUPROPION HYDROCHLORIDE 150 MG: 150 TABLET, FILM COATED, EXTENDED RELEASE ORAL at 08:26

## 2023-08-13 ASSESSMENT — ACTIVITIES OF DAILY LIVING (ADL)
ADLS_ACUITY_SCORE: 35

## 2023-08-13 NOTE — PROGRESS NOTES
Patient is pleasant and cooperative with care and staff. Pt is medications compliant. Pt contracted for safety.  Denied HI/SI,  pain, VH/AH and all psych symptoms.  Safety check completed every 15 minutes. No respiratory distress noted or observed. Pt had a shower. Will continue to monitor pt.

## 2023-08-13 NOTE — TELEPHONE ENCOUNTER
00:10 - Received call from [facility] RN reporting [facility] is reviewing but they only received labwork.   00:21 - Faxed facesheet and notes to [facility] for review  06:19 - [facility] (Callie) reports pt was declined d/t lacking acuity

## 2023-08-13 NOTE — ED NOTES
7:38 am I received report on this patient and will take over care resting in bed with eyes closed chest rise and fall observed.8:30 am patient eating breakfast calm and alert x 4 ate 60% of the meal and she can voice her needs.13:46 I checked on patient she was resting with eyes closed and when I talked it she did have response to my voice Ate 75 % of her lunch denies any needs at this time.14:21 pm patient resting in bed eyes closer chest rise fall observed.

## 2023-08-13 NOTE — ED PROVIDER NOTES
[facility] ED Mental Health Handoff Note:       Brief HPI:  This is a 53 year old female signed out to me by the previous provider.  See initial ED Provider note for full details of the presentation.   Interval history is pertinent for no acute events.  Pending inpatient bed search    Home meds reviewed and ordered/administered: Yes    Medically stable for inpatient mental health admission: Yes    Evaluated by mental health: Yes    Safety concerns: At the time I received sign out, there were no safety concerns.    Hold Status:  Active Orders   N/A       Exam:   Patient Vitals for the past 24 hrs:   BP Temp Temp src Pulse Resp   08/12/23 2103 127/72 98.4  F (36.9  C) Oral 97 18       GEN: resting in bed, calm  PULM: breathing comfortably, in no respiratory distress  PSYCH: Calm and cooperative, interactive    ED Course:    Medications   buPROPion (WELLBUTRIN SR) 12 hr tablet 150 mg (150 mg Oral $Given 8/12/23 0859)   gabapentin (NEURONTIN) capsule 300 mg (300 mg Oral $Given 8/12/23 2110)   hydrOXYzine (ATARAX) tablet 25-50 mg (has no administration in time range)   umeclidinium-vilanterol (ANORO ELLIPTA) 62.5-25 MCG/ACT oral inhaler 1 puff (1 puff Inhalation Not Given 8/12/23 0827)   QUEtiapine (SEROquel) tablet 100 mg (100 mg Oral $Given 8/12/23 2110)   nicotine polacrilex (NICORETTE) gum 4 mg (4 mg Buccal $Given 8/12/23 2124)            There were no significant events during my shift.    Patient was signed out to the oncoming provider.      Impression:    ICD-10-CM    1. Suicidal ideation  R45.851       2. Homicidal ideation  R45.850       3. Depression, unspecified depression type  F32.A           Plan:    Awaiting inpatient mental health admission/transfer.      RESULTS:   No results found for this visit on 08/10/23 (from the past 24 hour(s)).    MD Kathryn Cuevas, MD Martina  08/13/23 5929

## 2023-08-13 NOTE — PROGRESS NOTES
Patient slept through the night with no issue. Woke up few times to eat snack. Safety check completed every 15 minutes. No respiratory distress noted or observed. Patient is still in bed sleeping. Pt denied from [facility]. Will continue to monitor pt.
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
## 2023-08-13 NOTE — TELEPHONE ENCOUNTER
R:    11:04PM [facility] currently reviewing pt for placement.  Information faxed.  Awaiting call back.      No appropriate beds within [place].  Bed Search update Statewide 10:00PM  Facilities not updated in the past 12 hours have been called    [facility]: @ cap per website.    [facility]: @cap per website.  Per Brooklynn, no beds  [facility]: @ cap per website.  Per Chacho, no beds  [facility]: @ cap per website.  Per Brooklynn, no beds  [facility]: @ cap per website.  Per Alison, no beds  [facility]: @ cap per website.  Per Brooklynn, no beds  [facility]: @ cap per website  [facility]:  @ cap per website.  Per Brooklynn, no beds  [facility]: 6 LOW ACUITY beds available.   Mixed unit with children.  Pt declined due to acuity  [facility]: @ cap per website.  Per Brooklynn, no beds   [facility]: @ cap per website  [facility]: 2 bed posted.  Per Brooklynn, no beds.  Try back at 10AM 8/13.  [facility]:  1 bed posted.  Per Shannon, no neds.  Try back tomorrow.  [facility]: @ cap per website  [facility]: @ cap per website  [facility]: 4 LOW ACUITY bed posted.  Pt declined due to acuity  [facility]: @  cap per website    [facility]: 1 LOW ACUITY beds posted. Per Wendie, pt not appropriate due to acuity   [facility]: @ cap per website  [facility]: @ cap per website  [facility]: 2 beds posted.  Voluntary pts only.  Mixed unit with children.   Per Regine, pt not appropriate due to HI  [facility]: 14 beds posted  [place] of [place].  Requested to pts RN if [place] would be OK with pt. Attempted 2 phone calls to [facility] with no answer.  Will continue to call.  [facility]: 4 LOW ACUITY beds available.   Mixed unit with children.  Not appropriate due to HI.    Pt remains on PPS work list awaiting appropriate placement.

## 2023-08-14 ENCOUNTER — TELEPHONE (OUTPATIENT)
Dept: BEHAVIORAL HEALTH | Facility: CLINIC | Age: 53
End: 2023-08-14
Payer: MEDICAID

## 2023-08-14 PROCEDURE — 250N000013 HC RX MED GY IP 250 OP 250 PS 637: Performed by: EMERGENCY MEDICINE

## 2023-08-14 PROCEDURE — 250N000013 HC RX MED GY IP 250 OP 250 PS 637

## 2023-08-14 PROCEDURE — 250N000013 HC RX MED GY IP 250 OP 250 PS 637: Performed by: FAMILY MEDICINE

## 2023-08-14 PROCEDURE — 99245 OFF/OP CONSLTJ NEW/EST HI 55: CPT

## 2023-08-14 RX ORDER — OLANZAPINE 10 MG/2ML
10 INJECTION, POWDER, FOR SOLUTION INTRAMUSCULAR 3 TIMES DAILY PRN
Status: DISCONTINUED | OUTPATIENT
Start: 2023-08-14 | End: 2023-08-16

## 2023-08-14 RX ORDER — LITHIUM CARBONATE 300 MG/1
300 CAPSULE ORAL 2 TIMES DAILY
Status: DISCONTINUED | OUTPATIENT
Start: 2023-08-14 | End: 2023-08-15

## 2023-08-14 RX ORDER — OLANZAPINE 10 MG/1
10 TABLET, ORALLY DISINTEGRATING ORAL 3 TIMES DAILY PRN
Status: DISCONTINUED | OUTPATIENT
Start: 2023-08-14 | End: 2023-08-16

## 2023-08-14 RX ADMIN — QUETIAPINE FUMARATE 100 MG: 100 TABLET ORAL at 21:13

## 2023-08-14 RX ADMIN — HYDROXYZINE HYDROCHLORIDE 50 MG: 25 TABLET, FILM COATED ORAL at 08:24

## 2023-08-14 RX ADMIN — OLANZAPINE 10 MG: 10 TABLET, ORALLY DISINTEGRATING ORAL at 20:23

## 2023-08-14 RX ADMIN — OLANZAPINE 10 MG: 10 TABLET, ORALLY DISINTEGRATING ORAL at 18:50

## 2023-08-14 RX ADMIN — GABAPENTIN 300 MG: 300 CAPSULE ORAL at 21:12

## 2023-08-14 RX ADMIN — NICOTINE POLACRILEX 4 MG: 4 GUM, CHEWING BUCCAL at 14:29

## 2023-08-14 RX ADMIN — LITHIUM CARBONATE 300 MG: 300 CAPSULE, GELATIN COATED ORAL at 11:58

## 2023-08-14 RX ADMIN — BUPROPION HYDROCHLORIDE 150 MG: 150 TABLET, FILM COATED, EXTENDED RELEASE ORAL at 08:21

## 2023-08-14 RX ADMIN — LITHIUM CARBONATE 300 MG: 300 CAPSULE, GELATIN COATED ORAL at 09:16

## 2023-08-14 RX ADMIN — HYDROXYZINE HYDROCHLORIDE 50 MG: 25 TABLET, FILM COATED ORAL at 18:40

## 2023-08-14 ASSESSMENT — ACTIVITIES OF DAILY LIVING (ADL)
ADLS_ACUITY_SCORE: 35

## 2023-08-14 NOTE — PROGRESS NOTES
Patient slept through the night with no issue. Safety check completed every 15 minutes. No respiratory distress noted or observed. Patient is still in bed sleeping. Will continue to monitor pt.
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
## 2023-08-14 NOTE — TELEPHONE ENCOUNTER
No appropriate beds at this time.     [place]: [place]  Access Inpatient Bed Call Log 8/14/23 3:28PM   Intake has called facilities that have not updated the bed status within the last 12 hours    [facility] is at capacity.    [facility] is posting 0 beds. [phone]. Per call @7:14pm to [facility] at capacity not anticipating any openings can call back later.    [facility] is posting 0 beds. Negative covid required.    [facility] is posting 0 beds. Negative covid required. 72 HH preferred [phone]. Per call @7:18am to Chacho unlikely any availability can call to confirm after 9am.    [facility] is posting 0 beds.    [facility] is posting 0 beds. [phone]    [facility] is posting 0 beds    [facility] ([facility]) is posting 0 bed. Low acuity.    [facility] is posting 0 beds. Low acuity only.    [facility] is posting 5 beds. LOW acuity ONLY. Mixed unit 12+. Negative covid- [phone].    [facility] has 0 beds posted. No aggression. Negative Covid. Low acuity.    [facility] is posting 0 beds. Negative covid. [phone];    [facility] ([place])has 0 beds posted. Low acuity only. Negative covid. [phone]. Per call @7:22am no availability, however, can change throughout the day.    [facility] is posting 2 beds. Low acuity. No current aggression.    [facility] ([place]) is posting 0 beds Low acuity only. Negative covid. [phone].    [facility] is posting 2 bed. Low acuity. 72 HH hold preferred. Negative covid required. [phone].     [facility] ([place]) has 1 beds posted. Low acuity only. Negative covid. - [phone].    [facility] [place] is posting 0 beds. Negative covid required. Vol only, No history of aggression, violence, or assault. No sexual offenders. No 72 HH holds. [phone]. Per call @ 7:27am to Zaira currently at capacity.    Padmaja Vazquez  [facility] is posting 7 beds. Negative covid required. (Must have the cognitive ability to do programming. No aggressive or violent behavior or recent HX in the last 2 yrs. MH must be primary.) Always low acuity. [phone].    [facility] has 1 bed posted. Negative covid required. Low acuity only. Violence and aggression capped. [phone].     [facility] is posting 0 bed. Low acuity, Negative covid required. [phone]. Per call@7:35am to [place] they are FULL.    [facility] is posting 3 beds. Negative covid required. Vol only. Combined adolescent and adult unit. No aggressive or violent behavior. No registered sex offenders. [phone]    [facility] is posting 0 bed Negative covid required. [phone].    [facility] is posting 0 beds. Negative covid. Always low acuity. (No lines, drains, or tubes, oxygen, CPAP, IV, etc.). [phone]. Per call @7:37am to Ann one poss bed available.    [facility] is posting 10 beds. No covid test required. [phone].     [facility] ([facility]) is posting 0 beds. Negative covid. Mixed unit w/ teens. (No. lines, drains, or tubes, oxygen, CPAP, IV, etc.). [phone];  Pt remains on the work list pending appropriate bed availability.    [facility] Access Inpatient Bed Call Log 8/14/23 3:37pm  Intake has called facilities that have not updated their bed status within the last 12 hours.  (Adolescents):    [facility] is posting 1 beds.    [facility] is posting 0 beds. Negative covid.    [facility] is posting 0 beds.    [facility] is posting 3 beds Call for details. Negative covid. [phone]. Per call@7:41am to Lexi a handful of dbl and child beds available, no single lock.    [facility] is posting 5 beds. Mixed unit (12+) Low acuity. Negative covid [phone].Per call@ 7:43am to Callie beds are available low acuity only.    [facility] is posting 0  beds. Negative covid. [phone]    [facility] is posting 0 beds.    [facility] ([place]) is posting 6 Beds. Aggression NOT capped. Negative covid. [phone].    [facility] is posting 2 beds. Negative covid. Low acuity only, Violence/aggression capped. [phone].    [facility] is posting 3 beds. Unit is a combined unit (14+). No aggressive patients. Voluntary only. Must be accompanied by a guardian. Negative covid. [phone].     [facility] is posting 2 beds. No covid is required. [phone].     [facility] ([facility] is posting 0 beds. Unit is a mixed unit (13+) Negative covid. (No lines, drains, or tubes, oxygen, CPAP, IV, etc.).  Pt remains on waitlist pending appropriate availability.  [place]: [place]  Access Inpatient Bed Call Log 8/14/23 3:39PM  Intake has called facilities that have not updated their bed status within the last 12 hours.  Kids (<12):    [facility] is posting 0 beds    [facility] is positing 0 bed. Low Acuity, no aggression.    [facility] is posting 2 beds. Negative covid. [phone].     [facility] ([place] is posting 3 beds. Aggression capped. Negative covid.    [facility] is posting 0 beds. Low acuity only. Negative covid. [phone].    [facility] is posting 0 beds. No covid is required. [phone].   Pt remains on waitlist pending appropriate availability

## 2023-08-14 NOTE — ED PROVIDER NOTES
[facility] ED Mental Health Handoff Note:       Brief HPI:  This is a 53 year old female signed out to me.  See initial ED Provider note for full details of the presentation. Interval history is pertinent for ongoing ED boarding. No acute concerns today.    Home meds reviewed and ordered/administered: Yes    Medically stable for inpatient mental health admission: Yes.    Evaluated by mental health: Yes. The recommendation is for inpatient mental health treatment. Bed search in process    Safety concerns: At the time I received sign out, there were no safety concerns.    Hold Status:  Active Orders   N/A       Exam:   Patient Vitals for the past 24 hrs:   BP Temp Temp src Pulse Resp SpO2   08/14/23 0943 94/63 98.2  F (36.8  C) Oral 83 18 99 %   08/13/23 1950 105/72 98.4  F (36.9  C) Oral 98 18 99 %       ED Course:    Medications   buPROPion (WELLBUTRIN SR) 12 hr tablet 150 mg (150 mg Oral $Given 8/14/23 0821)   gabapentin (NEURONTIN) capsule 300 mg (300 mg Oral $Given 8/13/23 2104)   hydrOXYzine (ATARAX) tablet 25-50 mg (50 mg Oral $Given 8/14/23 0824)   QUEtiapine (SEROquel) tablet 100 mg (100 mg Oral $Given 8/13/23 2104)   nicotine polacrilex (NICORETTE) gum 4 mg (4 mg Buccal $Given 8/12/23 2124)   albuterol (PROVENTIL HFA/VENTOLIN HFA) inhaler (2 puffs Inhalation Not Given 8/14/23 1144)   lithium capsule 300 mg (300 mg Oral $Given 8/14/23 1158)   OLANZapine zydis (zyPREXA) ODT tab 10 mg (has no administration in time range)     Or   OLANZapine (zyPREXA) injection 10 mg (has no administration in time range)            There were no significant events during my shift.    Impression:    ICD-10-CM    1. Suicidal ideation  R45.851       2. Homicidal ideation  R45.850       3. Depression, unspecified depression type  F32.A           Plan:    Awaiting mental health evaluation/recommendations.      RESULTS:   No results found for this visit on 08/10/23 (from the past 24 hour(s)).          Carlos Alberto Del Rosario  Carlos Alberto Montano MD  08/14/23 1355
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
## 2023-08-14 NOTE — ED NOTES
IP MH Referral Acuity Rating Score (RARS)    LMHP complete at referral to IP MH, with DEC; and, daily while awaiting IP MH placement. Call score to PPS.  CRITERIA SCORING   New 72 HH and Involuntary for IP MH (not adolescent) 0/1   Boarding over 24 hours 1/1   Vulnerable adult at least 55+ with multiple co morbidities; or, Patient age 11 or under 0/1   Suicide ideation without relief of precipitating factors 1/1   Current plan for suicide 0/1   Current plan for homicide 0/1   Imminent risk or actual attempt to seriously harm another without relief of factors precipitating the attempt 1/1   Severe dysfunction in daily living (ex: complete neglect for self care, extreme disruption in vegetative function, extreme deterioration in social interactions) 1/1   Recent (last 2 weeks) or current physical aggression in the ED 0/1   Restraints or seclusion episode in ED 0/1   Verbal aggression, agitation, yelling, etc., while in the ED 0/1   Active psychosis with psychomotor agitation or catatonia 0/1   Need for constant or near constant redirection (from leaving, from others, etc).  0/1   Intrusive or disruptive behaviors 0/1   TOTAL Acuity Total Score: 4     YOLY Belle

## 2023-08-14 NOTE — PROGRESS NOTES
"Triage & Transition Services, Extended Care      Client Name: Madison Abrams \"Madison\"   Date: August 14, 2023  Service Type:  Group Therapy  Site Location: [facility]  Facilitator: Milena Christian     Topic:   Art Group: Painting      Patient was in her room sleeping and did not participate in group.     Milena Christian  Extended Care Coordinator  "
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
## 2023-08-14 NOTE — ED PROVIDER NOTES
Patient was getting restless with her ED boarding and want demanding to leave. She was exhibiting paranoia and blaming staff for taking her belongings. She was unable to safety contract.    I decided to place patient on a 72 hour hold due to her underlying paranoia and inability to safety contract.     Carlos Alberto Del Rosario MD  08/14/23 9829

## 2023-08-14 NOTE — ED NOTES
"I received report on this patient and will take over care. 7:40 am patient was up awake to use restroom, I was told to remove patients personal blankets and when I asked patient that I need  to remove them. She got very upset and she has had them for last 5 days and now they are to be removed she threw them on the floor and voiced to get out of my room in which I did per her requests.(\"Stated if you believe I have drugs hidden in my blanks take my blood\") I put blankets in the  personal property self storage locker areas.Patient voiced \" when I'm I getting out of here\". 9:00 am KAUR العلي seen her this am and she doing better and not mad and has accepted she cannot have her personal blankets. 9:30 am Patient calm resting in bed eyes open and close she acknowledges my present when I knock on the door.9:30 am extra blankets were given patient did  accept care.Encouraged to drink more fluids.  "

## 2023-08-14 NOTE — PROGRESS NOTES
Triage & Transition Services, Extended Care    Client Name: Madison Abrams    Date: August 14, 2023  Service Type:  Group Therapy  Site Location: [facility]    Topic:   Drawing a peaceful picture and deep breathing    Intervention:    Group process: support, challenge, affirm, psycho-education.     Response:  Patient was asleep and therefore did not participate in group.    AGATHA Noriega

## 2023-08-14 NOTE — PROGRESS NOTES
Patient is pleasant and cooperative with care and staff. Pt is medications compliant. Pt contracted for safety. PT states she wishes she was dead but denied to elaborate. Denied pain, HI/VH/AH and all psych symptoms. Pt spent most of the evening in her room. Safety check completed every 15 minutes. No respiratory distress noted or observed. Pt had a shower. Will continue to monitor pt.
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
## 2023-08-14 NOTE — PROGRESS NOTES
"Triage & Transition Services, Extended Care      Client Name: Madison Abrams \"Madison\"   Date: August 14, 2023  Service Type:  Group Therapy  Site Location: [facility]  Facilitator: Milena Christian     Topic:   Strengths     Intervention:    Patient was in her room and writer asked pt if she would like to participate in group.     Response:  Patient declined participating in group and did not participate in group.     Milena Christian  Extended Care Coordinator  "

## 2023-08-15 ENCOUNTER — TELEPHONE (OUTPATIENT)
Dept: BEHAVIORAL HEALTH | Facility: CLINIC | Age: 53
End: 2023-08-15
Payer: MEDICAID

## 2023-08-15 PROCEDURE — 99284 EMERGENCY DEPT VISIT MOD MDM: CPT

## 2023-08-15 PROCEDURE — 250N000013 HC RX MED GY IP 250 OP 250 PS 637

## 2023-08-15 PROCEDURE — 250N000013 HC RX MED GY IP 250 OP 250 PS 637: Performed by: FAMILY MEDICINE

## 2023-08-15 RX ORDER — ACETAMINOPHEN 325 MG/1
975 TABLET ORAL EVERY 8 HOURS PRN
Status: DISCONTINUED | OUTPATIENT
Start: 2023-08-15 | End: 2023-08-23 | Stop reason: HOSPADM

## 2023-08-15 RX ORDER — LITHIUM CARBONATE 300 MG/1
300 CAPSULE ORAL 2 TIMES DAILY
Status: DISCONTINUED | OUTPATIENT
Start: 2023-08-15 | End: 2023-08-23 | Stop reason: HOSPADM

## 2023-08-15 RX ORDER — QUETIAPINE 150 MG/1
150 TABLET, FILM COATED, EXTENDED RELEASE ORAL AT BEDTIME
Status: DISCONTINUED | OUTPATIENT
Start: 2023-08-15 | End: 2023-08-18

## 2023-08-15 RX ADMIN — LITHIUM CARBONATE 300 MG: 300 CAPSULE, GELATIN COATED ORAL at 08:44

## 2023-08-15 RX ADMIN — HYDROXYZINE HYDROCHLORIDE 25 MG: 25 TABLET, FILM COATED ORAL at 18:51

## 2023-08-15 RX ADMIN — LITHIUM CARBONATE 300 MG: 300 CAPSULE, GELATIN COATED ORAL at 21:42

## 2023-08-15 RX ADMIN — ACETAMINOPHEN 975 MG: 325 TABLET, FILM COATED ORAL at 09:19

## 2023-08-15 RX ADMIN — BUPROPION HYDROCHLORIDE 150 MG: 150 TABLET, FILM COATED, EXTENDED RELEASE ORAL at 08:44

## 2023-08-15 RX ADMIN — GABAPENTIN 300 MG: 300 CAPSULE ORAL at 21:42

## 2023-08-15 RX ADMIN — QUETIAPINE FUMARATE 150 MG: 150 TABLET, EXTENDED RELEASE ORAL at 21:42

## 2023-08-15 ASSESSMENT — ACTIVITIES OF DAILY LIVING (ADL)
ADLS_ACUITY_SCORE: 35

## 2023-08-15 NOTE — PROGRESS NOTES
"Triage & Transition Services, Extended Care     Therapy Progress Note    Patient: Madison goes by \"Madison,\" uses she/her pronouns  Date of Service: August 15, 2023  Site of Service: [facility]  Patient was seen in-person.     Presenting problem:   Madison is followed related to Long wait time for admission: 112+ hours in the ED . Please see initial DEC/[facility] Crisis Assessment completed by Leonor Lomas on 8/10/2023 for complete assessment information. Notable concerns include: suicidal and homicidal ideation.     72 hour hold started 2023 at 1847 and will  2023 at 1847.   C&L team has filed a petition for committment with [facility].     Individuals Present: Madison & AGATHA Crouch        Current Presentation:   Patient was resting when Writer arrived. She remained laying down with her eyes closed. Patient reports she is feeling dizzy and has been all day. She denies any other concerns or questions. She reports just wanting to know when and where she will be going. Patient requested Writer inform nurse of dizziness and asked to continue resting.        Diagnosis:   Depression F32.A       Therapeutic Intervention(s):   Provided active listening, unconditional positive regard, and validation.     Treatment Objective(s) Addressed:   The focus of this session was on rapport building and assessing safety {or     Plan:   Inpatient Medical: Safety and stabilization  72 hour hold started 2023 at 1847 and will  2023 at 1847.   C&L team has filed a petition for committment with [facility].     Plan for Care reviewed with Assigned Medical Provider? Yes. Provider, Niurka Weston LICSW   Licensed Mental Health Professional (HP), Extended Care  [phone]     "

## 2023-08-15 NOTE — CONSULTS
[facility] COURT- PROBATE/MENTAL HEALTH DIVISION  FIRST([place])    NEUROLEPTIC MEDICATION NOTE FOR DIAZ PROCEEDINGS      Patient s name: Madison LAZAR Aliza    FOR EMERGENCY USE ONLY:If a medical emergency has been declared before this note, please describe:   a.The basis for the emergency: N/A  b.The neuroleptic medications provided: N/A  _____________________N/A_______________________________________________________________________________________________________________________________________     1. Briefly describe the patient s clinical condition that supports a recommendation for the treatment with neuroleptic medication:   Ana Paulatoyin Jaycee, just ask one of nurse manager by office F 150s by Narayan dow I think her name is Kirsity for public notary to notarized my Diaz, she said she have to ask you in order to notrized my paper, it is kind of weird  Public notary refusing their co-worker, althoug I told her I work her in ER, doing my Diaz for [facility] patient,   2.   List the working diagnosis(es) of the condition(s) for which neuroleptic medication is recommended:   Paranoid ideation.  Suicidal ideation  Homicidal ideation  Depression, unspecified depression type  3. Is neuroleptic medication the treatment of choice in prevailing medical practice?  yes or no: yes    4. Treatment options other than neuroleptics that alone effectively treat the illness:none     5.   Medication ordered or proposed: ( no more than two to be used simultaneously unless meds are being changed or emergency exists, unless otherwise specified):   OR: unusual circumstances exist under which physician requests authorization to use any FDA approved neuroleptic.  Identify unusual circumstances and describe proposed course of treatment.     6.   Document the proposed course of treatment with neuroleptic medication, i.e., how the medication will be prescribed, monitored, and adjusted:        Medication will  be prescribed by psychiatrist or psychiatric nurse practitioner while patient is inpatient mental health unit Monitored and adjusted accordingly by qualified staffs  7.   If the patient has a known record with neuroleptics, please summarize his/her history regarding risks/benefits and whether this is predictive of expected response:         8. List the possible risks and side effects, and what can be done should they occur.  Include any specific risks associated with the patient s age/gender/ethnic identity or medical condition.  Does this patient have any medical conditions that could be exacerbated by neuroleptics?   possible adverse effects including, but not limited to: akathisia, extrapyramidal symptoms, dystonia, drowsiness, and long term concerns for tardive dyskinesia, weight gain, insulin resistance, dyslipidemia, and cognitive slowing. The medication will be adjusted according to side effect and benefit, patient will be assessed daily for side effect      9. Indicate likely benefits and outcomes for the patient after treatment with neuroleptic medication, including prognosis if neuroleptic medication is not administered (even if other forms of therapy are utilized):   Benefit outcomes the risk    10. Does this patient have tardive dyskinesia?   yes or no: no  If yes, how serious is the tardive dyskinesia, why are neuroleptics still indicated and, if applicable, why use typical as opposed to atypical neuroleptics?     11. For a patient to have the capacity to decide about the use of neuroleptics, the answers to a), b) and c) below must be answered  yes.   If one or more of these questions is answered  no,  the Court may find that the patient lacks this capacity, and the Court may make the decision after a hearing.    a)Does the patient demonstrate awareness of the nature of the patient s situation, including the reasons for hospitalization and possible consequences of refusing treatment with neuroleptic  medication? yes or no: no    b) Does the patient have an understanding of treatment with neuroleptic medication including the risks, benefits, and alternatives? yes or no: no     c)   Does the patient communicate verbally or nonverbally a clear choice regarding treatment that is reasoned and not based on a symptom of the patient s mental illness, even though it may not be in the patient s best interests?   yes or no: no    d)  If the patient objects, is the objection based on family, community, moral, Methodist, and/or social values?  yes or no: no If yes, briefly describe:     12. Document specific efforts that have been made to assist patient in understanding the risks and benefits:   Due to current paranoid ideation patient not reliable    13. If the patient is consenting to the medication, why is a court order necessary?  If the patient has history of  unreliable consent,  please summarize:   Due to current paranoid ideation patient is not reliable    14. Is the proposed treatment experimental or part of a research study?  yes or no: no    15. In this patient s case, do the benefits of the treatment outweigh the risks?  Please summarize:       The above statements represent my opinion within a reasonable degree of medical certainty.      Physician s or Other Provider s Signature: _______________________________    Date: August 15, 2023  Time: 11:15 AM    Printed Provider s Name: BREANNE Kelley CNP {Psychiatric clinical nurse specialist/Nurse Practitioner    [facility] EMERGENCY DEPARTMENT  [address] [address]  [phone]
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
## 2023-08-15 NOTE — CONSULTS
"Mental Health Transition and Triage-Consult and Liaison  Pre-petition Screening/Commitment Request Status Plan of Care      Madison LAZAR Aliza  August 15, 2023    Patient was not seen. Patient reported feeling dizzy all day, resting/laying down. Unable to meet.    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 Per Gamal: Paranoid, delusional, making homicidal thoughts, reported she killed her boyfriend or have caused harm to him. Boyfriend is currently missing for \"a couple of months\". Wants to kill the person that wronged her and have police kill her/die by suicide from police.      Current commitment status is: petition for civil commitment has been filed on 8/15/23    72hr-Hold Started: 8/14/23 at 6:47 PM   72hr-Hold Conclude: 8/17/23 at 6:47 PM    County of Responsibility:  [place]    Rationale for Pre-Petition Screening Request:    Depression, paranoia, delusional, meth use disorder, Suicidal ideation, and homicidal ideations towards \"someone who did me wrong\". Patient is feeling like she would try and kill this person and has thoughts of having the  kill her. Does not want to be alive in the same world that this person exists in.     Type of Commitment Requested: Mentally Ill and Chemically Dependent    Pre-petition screening paperwork has been sent to [facility] via Fax at 2 PM and includes:  PREPETITIONFORMS: Commitment Petition, Examiner's Statement, Exhibit A, Diaz Note, Diaz Petition     Supplemental documentation has also been send to the [facility] including: Patient Facesheet, Patient hospital encounter notes, and Patient 72 hour hold order    Pre-Petitions forms have been sent to Intake and Stat to be scanned to the EMR.  Yes    Verbal report given to [facility] commitment intake team via phone [facility] at 2:40 PM     Next " steps:   -Continue with 72 hour hold  -Continue with inpatient hospitalization recommendation.  -  Extended Care LMHP to continue to follow for support and disposition.  --Commitment LMHP signing off as PPS has been started. Please contact if further needs are required related to PPS.       OSMAN Dinh, LICSW, Psychotherapist  Consult and Liaison- Triage & Transition Services  Callback: [phone]

## 2023-08-15 NOTE — CONSULTS
"  Madison Abrams MRN# [id number]   Age: 53 year old YOB: 1970   Date of Admission to ED: 8/10/2023    In person visit Details:     Patient was assessed and interviewed face-to-face in person with this writer monico. Patient was observed to be able to participate in the assessment as evidenced by verbal consent. Assessment methods included conducting a formal interview with patient, review of medical records, collaboration with medical staff, and obtaining relevant collateral information from family and community providers when available.        Reason for Consult:   This note is being entered to supplement the psychiatry consultation note that was completed on August 10, 2023 by the licensed mental health professional  Gregorio SNIDER have reviewed the pertinent clinical details related to their encounter. I am being consulted to offer additional guidance on psychiatric pharmacological interventions, and to follow this patient as primary care provider while staying in the emergency room    Writer met patient in her room, patient was pleasant and cooperative, currently on 72-hour hold due to agitation and aggression on previous night.  During this assessment and interview patient continue wanting to leave AGAINST MEDICAL ADVICE.  Due to severe homicidal ideation and suicidal ideation, aggression and agitation will file civil commitment with Diaz through her [facility].  Continue her current medication which started on previous day.  Tylenol as needed for headache and pain.    I have reviewed the nursing notes. I have reviewed the findings, diagnosis, plan and need for follow up with the patient.         HPI:     Per ED provider Dr. Ochoa Hadley monique Abrams is a 53 year old female with PMH of hallucinations 2/2 meth use disorder, pulmonary emphysema 2/2 COPD, and MDD who presents to the ED with SI and HI. She reports being homicidal towards a specific person \"who did her wrong\". She denies " having a plan to commit suicide. Daughter reports patient's mood has been really down lately. She notes she has had ECT done here in the past. She is currently homeless and has multiple bags of belongings with her. Patient endorses alcohol and meth use.  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  Patient is feeling like she would try and kill this person and has thoughts of having the  kill her.       Pt has not required locked seclusion or restraints in the past 24 hours to maintain safety, please refer to RN documentation for further details.  Substance use does  appear to be playing a contributing role in the patient's presentation, patient is positive for amphetamine use disorder.  Brief Therapeutic Intervention(s):  Provided active listening, unconditional positive regard, and validation. Engaged in cognitive restructuring/ reframing, looked at common cognitive distortions and challenged negative thoughts. Engaged in guided discovery, explored patient's perspectives and helped expand them through socratic dialogue. Provided positive reinforcement for progress towards goals, gains in knowledge, and application of skills previously taught.  Engaged in social skills training. Explored and identified early warning signs to anger        Past Psychiatric History:   See DEC  note        Substance Use and History:   Amphetamine abuse severe        Past Medical History:   PAST MEDICAL HISTORY:   Past Medical History:   Diagnosis Date    ADD (attention deficit disorder with hyperactivity)     Anxiety     Bipolar affective disorder (H)     Chemical dependency (H)     COPD with emphysema (H)     Depressive disorder     H/O recurrent vertebral fractures     3x all assoicated with significant mechanism of injury    Spontaneous  "pneumothorax        PAST SURGICAL HISTORY:   Past Surgical History:   Procedure Laterality Date     SECTION      KNEE SURGERY      LAPAROSCOPIC TUBAL LIGATION                 Allergies:   No Known Allergies          Medications:   I have reviewed this patient's current medications  Current Facility-Administered Medications   Medication    acetaminophen (TYLENOL) tablet 975 mg    albuterol (PROVENTIL HFA/VENTOLIN HFA) inhaler    buPROPion (WELLBUTRIN SR) 12 hr tablet 150 mg    gabapentin (NEURONTIN) capsule 300 mg    hydrOXYzine (ATARAX) tablet 25-50 mg    lithium capsule 300 mg    nicotine polacrilex (NICORETTE) gum 4 mg    OLANZapine zydis (zyPREXA) ODT tab 10 mg    Or    OLANZapine (zyPREXA) injection 10 mg    QUEtiapine (SEROquel) tablet 100 mg     Current Outpatient Medications   Medication Sig    multivitamin w/minerals (THERA-VIT-M) tablet Take 1 tablet by mouth daily    UNKNOWN TO PATIENT \"Rescue inhaler\" but patient did not know the name              Family History:   FAMILY HISTORY: No family history on file.        Social History:   SOCIAL HISTORY:   Social History     Tobacco Use    Smoking status: Every Day     Packs/day: 0.10     Years: 29.00     Pack years: 2.90     Types: Cigarettes    Smokeless tobacco: Never   Substance Use Topics    Alcohol use: Yes     Alcohol/week: 0.8 standard drinks of alcohol     Types: 1 drink(s) per week     Comment: Previously completely Sober 3/13-.              PTA Medications:   (Not in a hospital admission)         Allergies:   No Known Allergies       Labs:   No results found for this or any previous visit (from the past 48 hour(s)).       Physical and Psychiatric Examination:     /70   Pulse 78   Temp 98.2  F (36.8  C) (Oral)   Resp 18   Ht 1.524 m (5')   Wt 41.7 kg (92 lb)   LMP 09/15/2014   SpO2 98%   BMI 17.97 kg/m    Weight is 92 lbs 0 oz  Body mass index is 17.97 kg/m .    Mental Status Exam:  Appearance: awake, alert and poorly " groomed  Attitude:  guarded  Eye Contact:  poor   Mood:  angry, anxious, sad , and depressed  Affect:  labile  Speech:  clear, coherent  Language: fluent and intact in English  Psychomotor, Gait, Musculoskeletal:  no evidence of tardive dyskinesia, dystonia, or tics  Thought Process:  logical and linear  Associations:  no loose associations  Thought Content:  active suicidal ideation present, no auditory hallucinations present, no visual hallucinations present, and obsessions present  Insight:  limited  Judgement:  limited  Oriented to:  time, person, and place  Attention Span and Concentration:  limited  Recent and Remote Memory:  limited  Fund of Knowledge:  low-normal         Diagnoses:      Suicidal ideation  Homicidal ideation  Depression, unspecified depression type         Recommendations:     1.* Pt displays the following risk factors that support IP admission: SI with a plan to end her life, , homicidal ideation toward unidentified woman threats with possible intent today, unable to contract for safety. Pt is unable to engage in safety planning to mitigate risk level in a non-secure setting. Lower levels of care have not been successful in mitigating risk. Due to this IP is the least restrictive option of care for pt. Pt should remain in IP until deemed safe to return to the community and engage in [facility] supports    - Continue to recommend inpatient psychiatric hospitalizations for further stabilization   2.  Continue Seroquel and Wellbutrin and gabapentin, and lithium 300 mg twice daily as needed hydroxyzine for an anxiety, Zyprexa 10 mg 3 times daily as needed for severe agitation and aggression, check lithium level August 18, 2023 before evening dose  3.  Continue 72-hour hold will file civil commitment with Joe, due to continuous suicidal and homicidal ideation refuses voluntary inpatient mental health unit.  4.  Consult psychiatry as needed  5.   Refer to psychiatric provider for medication  management.    treatment per ED team    - Consulted with NOLA Hernandez,   patient's ED RN regarding this case.    Please call [facility]/DEC at [phone] if you have follow-up questions or wish to place another consult.  Gamal Thomason, Psychiatric Nurse practitioner    Attestation:  Time with:  Patient: 20 minutes  Treatment Team: 20 Minutes  Chart Review: 20 minutes    Total time spent was 60 minutes. Over 50% of times was spent counseling and coordination of care.    I  I, Gamal Thomason, CNP, APRN, Psychiatric Nurse Practitioner have personally performed an examination of this patient.  I have edited the note to reflect all relevant changes.  I have discussed this patient with the care team August 14, 2023 I have reviewed all vitals and laboratory findings.    Disclaimer: This note consists of symbols derived from keyboarding,

## 2023-08-15 NOTE — PROGRESS NOTES
Triage & Transition Services, Extended Care    Client Name: Madison Abrmas    Date: August 15, 2023  Service Type:  Group Therapy  Session Start Time:  11:25am    Session End Time: 11:50am  Session Length: 25 minutes   Site Location: [facility]  Attendees: Patient and other group members  Facilitator: AGATHA Crouch     Topic:   Meditation     Intervention:    Group process: support, challenge, affirm, psycho-education.     Response:  Patient did not participate in group.      AGATHA Crouch

## 2023-08-15 NOTE — PROGRESS NOTES
Triage & Transition Services, Extended Care    Client Name: Madison Abrams    Date: August 15, 2023  Service Type:  Group Therapy    Intervention:    Group process: support, challenge, affirm, psycho-education.     Response:  Patient did not participate in group d/t sleeping.       Rubi Mas

## 2023-08-15 NOTE — ED NOTES
Patient has denied any feelings of SI, HI and no hallucinations at this time. She did eat her breakfast and lunch but otherwise has been in bed resting/sleeping. No outbursts this shift and she was medication compliant.

## 2023-08-15 NOTE — PROGRESS NOTES
"Pt is getting agitated after being taken away from her colored pencils that she is not supposed to have. Pt is yelling, swearing, cursing, and snapping items toward the doctor and staff. The patient asked to leave the hospital. Olanzapine 10 mg was given. The provider placed the patient on hold for 72 hours. PT appears paranoid and confused. PT said if you hold me here, you guys are going to deal with this behavior every day. One hour later, Pt asked staff, When will the medication kick in for her?\" The patient was told the medication may take an hour. The patient asked for snacks and drinks. After staff brought all the snacks to her, she threw them all over the place on the floor and the wall. Pt stated that anytime she asks for food, it is always changed. The patient is upset and agitated and says, \"Why do I have to be here with the same freaking people? You can put me in another room, or I can go back to ED.\" Another dose of olanzapine (10 mg) was ordered. The patient had a total of 20 mg of Olanzapine and was sleeping at this time. Dr. Del Rosario approves of holding Seroquel tonight but states that if the patient wakes up and wants her bedtime medication in the middle of the night, that can be given. PT later wants her medicine. The patient put her face toward the wall to sleep, not the pillow. The 72-hour hold was printed and put in the patient's room. Will continue to monitor.  "

## 2023-08-15 NOTE — ED NOTES
Patient blood pressure was low. She was encouraged to drink more fluids. No reports of any dizziness or lightheaded. I will recheck B/P.

## 2023-08-15 NOTE — PROGRESS NOTES
Patient slept through the night with no issue. Came out to use the bathroom one time. Safety check completed every 15 minutes. No respiratory distress noted or observed. Patient is still in bed sleeping. Will continue to monitor pt.

## 2023-08-15 NOTE — ED NOTES
IP MH Referral Acuity Rating Score (RARS)    LMHP complete at referral to IP MH, with DEC; and, daily while awaiting IP MH placement. Call score to PPS.  CRITERIA SCORING   New 72 HH and Involuntary for IP MH (not adolescent) 1/1   Boarding over 24 hours 1/1   Vulnerable adult at least 55+ with multiple co morbidities; or, Patient age 11 or under 0/1   Suicide ideation without relief of precipitating factors 1/1   Current plan for suicide 0/1   Current plan for homicide 0/1   Imminent risk or actual attempt to seriously harm another without relief of factors precipitating the attempt 1/1   Severe dysfunction in daily living (ex: complete neglect for self care, extreme disruption in vegetative function, extreme deterioration in social interactions) 1/1   Recent (last 2 weeks) or current physical aggression in the ED 0/1   Restraints or seclusion episode in ED 0/1   Verbal aggression, agitation, yelling, etc., while in the ED 0/1   Active psychosis with psychomotor agitation or catatonia 0/1   Need for constant or near constant redirection (from leaving, from others, etc).  0/1   Intrusive or disruptive behaviors 0/1   TOTAL Acuity Total Score: 5

## 2023-08-16 ENCOUNTER — MEDICAL CORRESPONDENCE (OUTPATIENT)
Dept: HEALTH INFORMATION MANAGEMENT | Facility: CLINIC | Age: 53
End: 2023-08-16
Payer: MEDICAID

## 2023-08-16 ENCOUNTER — TELEPHONE (OUTPATIENT)
Dept: BEHAVIORAL HEALTH | Facility: CLINIC | Age: 53
End: 2023-08-16
Payer: MEDICAID

## 2023-08-16 PROCEDURE — 250N000013 HC RX MED GY IP 250 OP 250 PS 637: Performed by: EMERGENCY MEDICINE

## 2023-08-16 PROCEDURE — 250N000013 HC RX MED GY IP 250 OP 250 PS 637: Performed by: FAMILY MEDICINE

## 2023-08-16 PROCEDURE — 250N000013 HC RX MED GY IP 250 OP 250 PS 637: Performed by: PSYCHIATRY & NEUROLOGY

## 2023-08-16 PROCEDURE — 250N000013 HC RX MED GY IP 250 OP 250 PS 637

## 2023-08-16 PROCEDURE — 124N000002 HC R&B MH UMMC

## 2023-08-16 PROCEDURE — 99232 SBSQ HOSP IP/OBS MODERATE 35: CPT

## 2023-08-16 RX ORDER — OLANZAPINE 10 MG/2ML
10 INJECTION, POWDER, FOR SOLUTION INTRAMUSCULAR 3 TIMES DAILY PRN
Status: DISCONTINUED | OUTPATIENT
Start: 2023-08-16 | End: 2023-08-23 | Stop reason: HOSPADM

## 2023-08-16 RX ORDER — MULTIPLE VITAMINS W/ MINERALS TAB 9MG-400MCG
1 TAB ORAL DAILY
Status: DISCONTINUED | OUTPATIENT
Start: 2023-08-16 | End: 2023-08-18

## 2023-08-16 RX ORDER — MAGNESIUM HYDROXIDE/ALUMINUM HYDROXICE/SIMETHICONE 120; 1200; 1200 MG/30ML; MG/30ML; MG/30ML
30 SUSPENSION ORAL EVERY 4 HOURS PRN
Status: DISCONTINUED | OUTPATIENT
Start: 2023-08-16 | End: 2023-08-23 | Stop reason: HOSPADM

## 2023-08-16 RX ORDER — ALBUTEROL SULFATE 90 UG/1
2 AEROSOL, METERED RESPIRATORY (INHALATION) EVERY 6 HOURS PRN
Status: ON HOLD | COMMUNITY
End: 2023-08-22

## 2023-08-16 RX ORDER — ACETAMINOPHEN 325 MG/1
650 TABLET ORAL EVERY 4 HOURS PRN
Status: DISCONTINUED | OUTPATIENT
Start: 2023-08-16 | End: 2023-08-16

## 2023-08-16 RX ORDER — AMOXICILLIN 250 MG
1 CAPSULE ORAL 2 TIMES DAILY PRN
Status: DISCONTINUED | OUTPATIENT
Start: 2023-08-16 | End: 2023-08-23 | Stop reason: HOSPADM

## 2023-08-16 RX ORDER — HYDROXYZINE HYDROCHLORIDE 25 MG/1
25 TABLET, FILM COATED ORAL EVERY 4 HOURS PRN
Status: DISCONTINUED | OUTPATIENT
Start: 2023-08-16 | End: 2023-08-16

## 2023-08-16 RX ORDER — OLANZAPINE 10 MG/1
10 TABLET ORAL 3 TIMES DAILY PRN
Status: DISCONTINUED | OUTPATIENT
Start: 2023-08-16 | End: 2023-08-23 | Stop reason: HOSPADM

## 2023-08-16 RX ORDER — TRAZODONE HYDROCHLORIDE 50 MG/1
50 TABLET, FILM COATED ORAL
Status: DISCONTINUED | OUTPATIENT
Start: 2023-08-16 | End: 2023-08-23 | Stop reason: HOSPADM

## 2023-08-16 RX ADMIN — HYDROXYZINE HYDROCHLORIDE 50 MG: 25 TABLET, FILM COATED ORAL at 22:07

## 2023-08-16 RX ADMIN — LITHIUM CARBONATE 300 MG: 300 CAPSULE, GELATIN COATED ORAL at 07:57

## 2023-08-16 RX ADMIN — HYDROXYZINE HYDROCHLORIDE 50 MG: 25 TABLET, FILM COATED ORAL at 08:00

## 2023-08-16 RX ADMIN — NICOTINE POLACRILEX 4 MG: 4 GUM, CHEWING BUCCAL at 22:10

## 2023-08-16 RX ADMIN — OLANZAPINE 10 MG: 10 TABLET, FILM COATED ORAL at 22:07

## 2023-08-16 RX ADMIN — BUPROPION HYDROCHLORIDE 150 MG: 150 TABLET, FILM COATED, EXTENDED RELEASE ORAL at 07:58

## 2023-08-16 RX ADMIN — GABAPENTIN 300 MG: 300 CAPSULE ORAL at 22:07

## 2023-08-16 RX ADMIN — ACETAMINOPHEN 975 MG: 325 TABLET, FILM COATED ORAL at 08:00

## 2023-08-16 RX ADMIN — LITHIUM CARBONATE 300 MG: 300 CAPSULE, GELATIN COATED ORAL at 22:07

## 2023-08-16 RX ADMIN — QUETIAPINE FUMARATE 150 MG: 150 TABLET, EXTENDED RELEASE ORAL at 22:59

## 2023-08-16 RX ADMIN — SENNOSIDES AND DOCUSATE SODIUM 1 TABLET: 50; 8.6 TABLET ORAL at 16:45

## 2023-08-16 RX ADMIN — NICOTINE POLACRILEX 4 MG: 4 GUM, CHEWING BUCCAL at 16:51

## 2023-08-16 ASSESSMENT — ACTIVITIES OF DAILY LIVING (ADL)
ADLS_ACUITY_SCORE: 45
ADLS_ACUITY_SCORE: 33
LAUNDRY: WITH SUPERVISION
ORAL_HYGIENE: INDEPENDENT
HYGIENE/GROOMING: INDEPENDENT
ADLS_ACUITY_SCORE: 35
ADLS_ACUITY_SCORE: 33
ADLS_ACUITY_SCORE: 35
ADLS_ACUITY_SCORE: 33
ADLS_ACUITY_SCORE: 35
ADLS_ACUITY_SCORE: 33
DRESS: INDEPENDENT
ADLS_ACUITY_SCORE: 35

## 2023-08-16 ASSESSMENT — LIFESTYLE VARIABLES
SKIP TO QUESTIONS 9-10: 0
AUDIT-C TOTAL SCORE: 4

## 2023-08-16 NOTE — PLAN OF CARE
"Admission:     Admitted to  30 on a 72 hour hold from [facility] for suicidal and homicidal ideation. Per chart, petition for commitment filed with Vivek Barraza on 8/15. Pt has been unwilling to name the person she has homicidal ideations about. 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 States she has also lost relationships and is losing her housing. Denies specific plan for suicide. Reports 9 past attempts, the last approximately 3-4 years ago. States the first was around 13 years old.     She continues to refuse to give the name of the person she has homicidal thoughts about during admission. States she uses meth daily, last use was August 6th, an hour before presenting to the hospital. Reports minimal alcohol use. Denies IV use, uses via smoking. [facility] States she has done ECT in the past and found it helpful.     Presents with blunted affect. Disheveled appearance. Speech is clear and coherent. Is mostly cooperative completing admission, but does becomes irritable occasionally. Guarded at times, suspicious. States she does not want [facility] having access to her medical records for the commitment process. Asks this RN to delete entries in the flowsheet regarding her homicidal ideation. When informed I cannot delete my documentation, she says \"Then I was lying.\" (About feeling homicidal) Informed I would include that information in this note. Oriented to room and unit. Encouraged to notify staff of needs, questions, and concerns.                   "

## 2023-08-16 NOTE — ED NOTES
No significant event this shift. Up x1 briefly to restroom at the change of the shift. Otherwise, Patient resting comfortably with eyes closed, respirations unlabored on all other safety checks at [facility]. No indication of pain distress/discomfort.
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
## 2023-08-16 NOTE — PROGRESS NOTES
9:25AM-Writer called PPS: Elaina Phone: [phone] to inquire about whether or not petition will be supported, no answer, lvm with request to return call.     3:08PM- Writer received call from Elaina who is starting pt's pre-petition screening. Writer provided Elaina with update on pt care and that pt has been transferred to IP . Writer provided Elaina with number to contact IP  unit.

## 2023-08-16 NOTE — ED NOTES
I received report on this patient and will take over care she resting in bed with eyes closed. Received tylenol for low back pan and right elbow pain 5/10. Observed with even gait and balance speech is clear and alert x 4.Denies any issues at this time she can voice her need.8:32 am Denies  wanting or needing ice or hot pack for back and elbow pain. She also ate 100 % of breakfast 9:49 am patient resting eyes closed's chest rise and fall observed. 10:29 am patient stays to herself in her room laying in bed with eyes closed.12:02 I asked patient about food pudding on the wall she stated she threw it there because staff took away her drawing pencils and lied to her and they call clean it off, I told her that is unacceptable.I would take care of it this time but anymore behaviors issues she have to go back to ED for unacceptable behaviors.Over med's for anxiety she denies needed.14:30 report given to station 30 and she will be going up to unit at 30 at 1440 hours.Patient stable alert x 4 and can voice her needs.
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
## 2023-08-16 NOTE — TELEPHONE ENCOUNTER
[facility] Access Inpatient Bed Call Log 8/16/2023 2:26 AM      Intake has called facilities that have not updated their bed status within the last 12 hours.     Adults:       [facility] is posting 0 beds.    [facility] is posting 0 beds. [phone]   [facility] is posting 0 beds. [phone]   [facility] is posting 0 beds. [phone] - 2:27am Per Jolene, they are capped.   [facility] is posting 0 beds. [phone]   [facility] is posting 0 bed. [phone]    [facility] is posting 0 beds. [phone]   [facility] is posting 0 beds. [phone]   [facility] is posting 0 beds. [phone]    [facility] is posting 1 beds.        [facility] is posting 0 beds. [phone]   [facility] is posting 5 beds. Mixed unit 12+. Low acuity only.  [phone]   [facility] is posting 1 beds. No aggression.  [phone]   [facility] is posting 0 beds. [phone]    [facility] is posting 3 beds. [phone]    [facility] is posting 1 bed. [phone]    [facility] is posting 0 beds. Low acuity. [phone]   [facility] is posting 2 beds. 72 hr hold preferred. [phone]   [facility] is posting 3 bed.  [phone] -2:30am Per Adry, they have low acuity beds.      [facility] is posting 3 bed. Voluntary only- no holds/commitments, No Hx of aggression, violence, or assault. No sexual offenders. No 72 hr holds. [phone]   [facility] is posting 4 beds. (Must have the cognitive ability to do programming. No aggressive or violent behavior or recent HX in the last 2 yrs.  must be primary.) [phone]  [facility] is posting 4 beds. Low acuity only. Violence and aggression capped. [phone]    [facility] is posting 0 bed. Low acuity, Neg Covid. [phone]  -2:39am Per Lizet they are open to reviewing low acuity.   [facility] is posting 3 beds. Covid neg. Vol only. Combined adolescent and adult unit. No aggressive or violent behavior. No registered sex offenders. [phone]- 2:45am Per Marium, they are open to review.   [facility] posting 0 beds. Negative covid.    [facility]: Posting 0 bed. (No hx of aggression/assault. No lines, drains or tubes. Does not provide detox or CD treatment).  [phone]   [facility] is posting 3 beds. [phone]       Pt remains on work list pending appropriate bed availability.

## 2023-08-16 NOTE — ED NOTES
"Pt presents calm, pleasant, and cooperative. Pt denies SI/HI and SIB, however reports feeling angry, sad, and anxious. Pt reports auditory hallucinations and explains that \"some of them are not good and I don't want to hear them\". Pt also explains to writer how she wants to hear some of the voices stating \"I'm , so I'm supposed to hear the voices of my ancestors\". Writer asked Pt if she would like any PRNs to help with anxiety or voices, and Pt responded she would and received Hydroxyzine.   Pt spent most of shift in room sleeping.  Pt behaviorally in control throughout shift.   "

## 2023-08-16 NOTE — ED NOTES
Triage & Transition Services, Extended Care    Client Name: Madison Abrams    Date: August 16, 2023  Service Type:  Group Therapy       Response:  Patient did not participate in group.      YOLY Gallo

## 2023-08-16 NOTE — PROGRESS NOTES
"Triage & Transition Services, Extended Care     Madison LAZAR Aliza  August 16, 2023    Madison is followed related to  long wait time for admission and 72HH expiring 8/17/2023 at 1847. Please see initial DEC Crisis Assessment completed by AGATHA Kohler on 8/10/2023 for complete assessment information. Medical record is reviewed. Notable concerns include depression, SI and HI.    There are not significant status changes.     Patient Presentation:  Patient reports her mood as \"angry and upset\". Patient described receiving conflicting information from staff about what she can and cannot have in the BEC. Patient described the inconsistency causing emotional distress, leading her to have some verbal agitation towards staff. Patient reports current anxiety severity as 3.5/5 (5 being the most severe). Patient rated her depression severity a 4/5 today. Patient continues to endorse SI and reports she has been \"crying\" more often. When talking about these symptoms patient became tearful. Patient reports having passive SI of being okay if she fell asleep and did not wake up, specifically stating \"I don't feel like I need to exist anymore\". Patient did not endorse any specific suicide plan(s). Patient reports tactile hallucinations of bugs crawling on her skin but denies any AH or VH. Patient continues to endorse anger and HI towards unidentified woman in her life. Patient unable to contract for safety.     This writer informed patient that she has been accepted to an IP MH unit and can anticipate a transfer likely today, depending on staffing. Patient was offered the space to provide thoughts, feelings and emotions about information provided and ask any questions. Patient inquired about showering before transferring and this writer recommended asking [facility] staff about a shower. This writer then provided information as to what she can expect during an IP MH admission.       Plan:  Inpatient Mental Health: Patient presented " with SI and HI as well as tactile hallucinations of bugs crawling on her. Patient is unable to contract for safety. Patient is currently on a 72HH expiring on 8/17/2023 at 1847. Petition for commitment was filed on 8/15/2023 at 1416. Patient was accepted to station 30 for IP MH treatment today, 8/16/2023.    Plan for Care reviewed with Assigned Medical Provider? Yes. Provider, BREANNE Weston, CNP, response: acknowledged.      Wendy Chaney, [facility], [facility]   [phone]

## 2023-08-16 NOTE — PROGRESS NOTES
"Triage & Transition Services, Extended Care      Client Name: Madison Abrams \"Maidson\"   Date: August 16, 2023  Service Type:  Group Therapy  Site Location: [facility]  Facilitator: Milena Christian     Topic:   Art Group: Journal decorating     Intervention:    Patient was in her room and writer asked pt if she would like to participate in group.     Response:  Patient declined participating in group and did not participate in group.     Milena Christian  Extended Care Coordinator  "

## 2023-08-16 NOTE — TELEPHONE ENCOUNTER
Pt not appropriate for beds available at this time.    R: [facility] Access Inpatient Bed Call Log 8/15/23 5:35PM   Intake has called facilities that have not updated the15 bed status within the last 12 hours.                           [facility] is at capacity.            [facility] is posting 0 beds. [phone].    [facility] is posting 0 beds. Negative covid required.               [facility] is posting 0 beds. Negative covid required. [phone].    [facility] is posting 0 beds.      [facility] is posting 0 beds. [phone]        [facility] is posting 0 beds         [facility] is posting 1 beds.      [facility] is posting 1 beds. Low acuity only.        [facility] is posting 5 beds. LOW acuity ONLY. Mixed unit 12+. Negative covid- [phone].  Per website @12:25am   [facility] has 1 bed posted. No aggression. Negative Covid. Low acuity.    [facility] is posting 0 beds. Negative covid. [phone]  Per voicemail @8:00am, at cap.   [facility] 4 beds. Low acuity only. Negative covid.  [phone].  Per website @6:00am   [facility] is posting 1 bed. Low acuity. No current aggression. Per website @7:03am   [facility] ([place]) is posting 0 beds Low acuity only. Negative covid.  [phone].      [facility] is posting 2 bed. Low acuity. 72 HH hold preferred. Negative covid required. [phone] Per call @ 7:59am. Reviewing today, unsure how for how many.   [facility] ([place]) 4 beds. Low acuity only. Negative covid. - [phone].  Per website @4:46am   [facility] is posting 2 bed.  Negative covid required.   Vol only, No history of aggression, violence, or assault. No sexual offenders. No 72 HH holds.   [phone]. Per website @4:24am          [facility] is posting 5 beds. Negative covid required.   (Must have the cognitive ability to do programming. No aggressive or violent behavior or recent HX in the last 2 yrs. MH must be primary.) Always low acuity. [phone]. Per call @4:48am   [facility] has 5 beds posted. Negative covid required.  Low acuity only. Violence and aggression capped.  [phone].  Per call and Website @7:44am    [facility] is posting 1 bed. Low acuity, Negative covid required.  [phone].     [facility] is posting 3 beds. Negative covid required.  Vol only. Combined adolescent and adult unit. No aggressive or violent behavior. No registered sex offenders.  [phone]    [facility] posting 2 bed Negative covid required.  [phone].     [facility] is posting 0 beds. Negative covid. (No lines, drains, or tubes, oxygen, CPAP, IV, etc.). [phone]. Per call @7:54am   [facility] is posting 10 beds. No covid test required.  [phone].  Per call @7:41am   [facility] ([facility] is posting 2 beds. Negative covid. (No. lines, drains, or tubes, oxygen, CPAP, IV, etc.).   [phone] Per website @4:16am          Pt remains on the work list pending appropriate bed availability.                   [facility] Access Inpatient Bed Call Log 8/15/23 5:40PM    Intake has called facilities that have not updated their bed status within the last 12 hours.                (Adolescents):             [facility] is posting 0 beds.     [facility] is posting 0 beds. Negative covid.      [facility] is posting 0 beds.      [facility] is posting 3 beds Call for details. Negative covid.  [phone].  Per call @7:50am   [facility] is posting 4 beds. Mixed unit (12+) Low acuity. Negative covid [phone]. Per website @12:25am   [facility] is posting 0 beds. Negative covid. [phone]     [facility] is posting 0 beds.         [facility] is posting 5 Beds. Aggression  capped. Negative covid.  [phone].  Per website@6:13am    [facility] is posting 2 beds. Negative covid. Low acuity only, Violence/aggression capped.  [phone]. Per call @7:44am ([place]) beds available, can review.   [facility] is posting 3 beds. Unit is a combined unit (14+). No aggressive patients. Voluntary only. Must be accompanied by a guardian.  Negative covid. [phone].    [facility] ([facility] is posting 2 beds. Unit is a mixed unit (13+) Negative covid. (No lines, drains, or tubes, oxygen, CPAP, IV, etc.). Per website @4:16am   [facility] is posting 4 beds. No covid is required. [phone]. Per call @7:41am       Pt remains on waitlist pending appropriate availability.

## 2023-08-16 NOTE — CARE PLAN
08/16/23 2313   Patient Belongings   Did you bring any home meds/supplements to the hospital?  Yes   Disposition of meds  Sent to security/pharmacy per site process   Patient Belongings Put in Hospital Secure Location (Security or Locker, etc.) cash/credit card;cell phone/electronics;clothing;glasses;suitcase   Belongings Search Yes   Clothing Search Yes   Second Staff Talisha         Items in pt locker: pillow with colorful case, colorful blanket, ugg slipper, robe, extension cord, phone & , mirror, colored pencil, beach towel, 3 pants, crayons, playing card, puzzle book, coloring book, sandals, green planner, sketch book, Samsung earbuds, cigarettes 18 sticks, top, pants suit, personal items bag, 3 lighter, 13 glasses, scissors, nails clippers, nails files, art supply binder, Q-tips box, underwear, dave bag, black & white belt, 3 bra, DL, secret deodorant,  face cleanser, 2 suitcases, long sleeve shirt       Item sent to Security: $ 22, visa 4789 Env # [id number]    Medication: Inhaler [id number]        ......A               Admission:  I am responsible for any personal items that are not sent to the safe or pharmacy.  [facility] is not responsible for loss, theft or damage of any property in my possession.    Signature:  _________________________________ Date: _______  Time: _____                                              Staff Signature:  ____________________________ Date: ________  Time: _____      2nd Staff person, if patient is unable/unwilling to sign:    Signature: ________________________________ Date: ________  Time: _____     Discharge:  [facility] has returned all of my personal belongings:    Signature: _________________________________ Date: ________  Time: _____                                          Staff Signature:  ____________________________ Date: ________  Time: _____

## 2023-08-17 LAB
CHOLEST SERPL-MCNC: 230 MG/DL
HBA1C MFR BLD: 6.1 %
HDLC SERPL-MCNC: 50 MG/DL
LDLC SERPL CALC-MCNC: 157 MG/DL
NONHDLC SERPL-MCNC: 180 MG/DL
TRIGL SERPL-MCNC: 115 MG/DL
TSH SERPL DL<=0.005 MIU/L-ACNC: 0.57 UIU/ML (ref 0.3–4.2)

## 2023-08-17 PROCEDURE — 36415 COLL VENOUS BLD VENIPUNCTURE: CPT | Performed by: PSYCHIATRY & NEUROLOGY

## 2023-08-17 PROCEDURE — 250N000013 HC RX MED GY IP 250 OP 250 PS 637

## 2023-08-17 PROCEDURE — 99222 1ST HOSP IP/OBS MODERATE 55: CPT | Mod: AI | Performed by: PSYCHIATRY & NEUROLOGY

## 2023-08-17 PROCEDURE — 124N000002 HC R&B MH UMMC

## 2023-08-17 PROCEDURE — 250N000013 HC RX MED GY IP 250 OP 250 PS 637: Performed by: PSYCHIATRY & NEUROLOGY

## 2023-08-17 PROCEDURE — 84443 ASSAY THYROID STIM HORMONE: CPT | Performed by: PSYCHIATRY & NEUROLOGY

## 2023-08-17 PROCEDURE — 250N000013 HC RX MED GY IP 250 OP 250 PS 637: Performed by: EMERGENCY MEDICINE

## 2023-08-17 PROCEDURE — 90853 GROUP PSYCHOTHERAPY: CPT

## 2023-08-17 PROCEDURE — 83036 HEMOGLOBIN GLYCOSYLATED A1C: CPT | Performed by: PSYCHIATRY & NEUROLOGY

## 2023-08-17 PROCEDURE — 250N000013 HC RX MED GY IP 250 OP 250 PS 637: Performed by: FAMILY MEDICINE

## 2023-08-17 PROCEDURE — 80061 LIPID PANEL: CPT | Performed by: PSYCHIATRY & NEUROLOGY

## 2023-08-17 RX ORDER — ALBUTEROL SULFATE 90 UG/1
2 AEROSOL, METERED RESPIRATORY (INHALATION) EVERY 6 HOURS PRN
Status: DISCONTINUED | OUTPATIENT
Start: 2023-08-17 | End: 2023-08-17

## 2023-08-17 RX ADMIN — OLANZAPINE 10 MG: 10 TABLET, FILM COATED ORAL at 13:14

## 2023-08-17 RX ADMIN — LITHIUM CARBONATE 300 MG: 300 CAPSULE, GELATIN COATED ORAL at 08:27

## 2023-08-17 RX ADMIN — OLANZAPINE 10 MG: 10 TABLET, FILM COATED ORAL at 08:46

## 2023-08-17 RX ADMIN — BUPROPION HYDROCHLORIDE 150 MG: 150 TABLET, FILM COATED, EXTENDED RELEASE ORAL at 08:27

## 2023-08-17 RX ADMIN — HYDROXYZINE HYDROCHLORIDE 50 MG: 25 TABLET, FILM COATED ORAL at 21:53

## 2023-08-17 RX ADMIN — LITHIUM CARBONATE 300 MG: 300 CAPSULE, GELATIN COATED ORAL at 21:53

## 2023-08-17 RX ADMIN — GABAPENTIN 300 MG: 300 CAPSULE ORAL at 21:53

## 2023-08-17 RX ADMIN — QUETIAPINE FUMARATE 150 MG: 150 TABLET, EXTENDED RELEASE ORAL at 21:53

## 2023-08-17 RX ADMIN — HYDROXYZINE HYDROCHLORIDE 50 MG: 25 TABLET, FILM COATED ORAL at 12:03

## 2023-08-17 ASSESSMENT — ACTIVITIES OF DAILY LIVING (ADL)
ADLS_ACUITY_SCORE: 33
ADLS_ACUITY_SCORE: 33
ORAL_HYGIENE: INDEPENDENT
ADLS_ACUITY_SCORE: 33
DRESS: STREET CLOTHES
HYGIENE/GROOMING: INDEPENDENT
ADLS_ACUITY_SCORE: 33
ADLS_ACUITY_SCORE: 33
LAUNDRY: WITH SUPERVISION
ADLS_ACUITY_SCORE: 33
ADLS_ACUITY_SCORE: 33
ORAL_HYGIENE: INDEPENDENT
HYGIENE/GROOMING: INDEPENDENT
ADLS_ACUITY_SCORE: 33
ADLS_ACUITY_SCORE: 33
DRESS: INDEPENDENT
ADLS_ACUITY_SCORE: 33
LAUNDRY: WITH SUPERVISION

## 2023-08-17 NOTE — PROGRESS NOTES
SPIRITUAL HEALTH SERVICES  SPIRITUAL ASSESSMENT Progress Note  [facility] ([facility]) Station 30     REFERRAL SOURCE: I did try to visit patient Madison twice per [facility] hospital  referral. However, on my both visit attempts pt was asleep. I informed to the unit staff about my visit attempts.    PLAN: I will remain open to provide spiritual care for the pt as needed.    Jinny Brito M.Div. ([place]), M.Th., D.Min., [facility]  Staff   Pager [phone]

## 2023-08-17 NOTE — PLAN OF CARE
Goal Outcome Evaluation:      Plan of Care Reviewed With: patient    Overall Patient Progress: improvingOverall Patient Progress: improving    Outcome Evaluation: providing Ensure Enlive TID, double portions, BID snacks    Jolene MOCTEZUAM  Mental Health Pager (M-F): [phone]  On Call Pager (weekends only): [phone]

## 2023-08-17 NOTE — PROVIDER NOTIFICATION
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   Individualized Care Needs Pt was recently admitted to the unit and would benefit from continued symptom stabilization and medication management. Pt has indicated a desire for ANNABELLA treatment and will need a CD consult.   Interprofessional Rounds   Summary Pt was recently admitted to the unit and would benefit from continued symptom stabilization and medication management. Pt has indicated a desire for ANNABELLA treatment and will need a CD consult.   Participants nursing;psychiatrist;CTC;OT   Behavioral Team Discussion   Participants Nitin Lebron MD; OSMAN Troy, LICSW; Sowmya Orlando, OTR/L; Nursing staff   Progress Pt was recently admitted to the unit and will continue with medication management and symptom stabilization.   Anticipated length of stay Civil commitment was filed. If supported, pt will be hospitalized through the court process.   Continued Stay Criteria/Rationale N/A   Medical/Physical No issues noted   Precautions None   Plan Multidisciplinary team evaluation, medication management, CD consult, care coordination   Rationale for change in precautions or plan N/A   Safety Plan Per unit protocol   Anticipated Discharge Disposition substance use treatment

## 2023-08-17 NOTE — PROGRESS NOTES
"CLINICAL NUTRITION SERVICES - ASSESSMENT NOTE     Nutrition Prescription    RECOMMENDATIONS FOR MDs/PROVIDERS TO ORDER:  N/A    Malnutrition Status:    Moderate malnutrition in the context of social/environmental and chronic disease    Recommendations already ordered by Registered Dietitian (RD):  Ensure Enlive TID, chocolate Magic Cup at 1400, cottage cheese with fruit at 2000  Double portions  Future/Additional Recommendations:  Monitor weight trends, intakes and supplement tolerance       REASON FOR ASSESSMENT  Madison Abrams is a 53 year old female assessed by the dietitian for positive Admission Nutrition Risk Screen: 20 lbs over last few months, decreased appetite    Reason for admission: Suicidal and homicidal ideation  PMH: hallucinations 2/2 meth use disorder, pulmonary emphysema 2/2 COPD, and MDD     NUTRITION HISTORY  Pt reports she has been living out of her car and had food insecurity last winter, but not currently. Last winter she ate only rice and noodles. Pt normally likes to \"eat a lot\" and likes to graze. Pt normally does not eat a lot of meat. Pt mentions she has had some broken teeth which has affected her eating but she declines a soft/easy to chew diet. Pt agrees to Ensure Enlive TID, double portions, and snacks.    CURRENT NUTRITION ORDERS  Diet: Regular, double portions  Intake/Tolerance: Fair per pt self report    LABS  Labs reviewed    MEDICATIONS  Medications reviewed  MVI, lithium    ANTHROPOMETRICS  Height: 152.4 cm (5' 0\")  Most Recent Weight: 42.5 kg (93 lb 12.8 oz)    IBW: 45.5 kg  Body mass index is 18.32 kg/m .  BMI: Underweight BMI <18.5  Weight History: Pt reports  lbs, does not know when she was last at that weight.  Wt Readings from Last 15 Encounters:   08/16/23 42.5 kg (93 lb 12.8 oz)   10/02/14 45.9 kg (101 lb 3.2 oz)   09/21/14 47.6 kg (105 lb)   03/11/13 48.1 kg (106 lb)   03/06/13 46.3 kg (102 lb)   03/05/13 47.6 kg (105 lb)   03/01/13 46.7 kg (103 lb)   10/25/12 " 48.1 kg (106 lb)   04/12/12 45.4 kg (100 lb)   Per Care Everywhere:  43.1 kg (95 lb) 06/29/2023     45.4 kg (100 lb) 09/20/2022     Dosing Weight: 42.5 kg    ASSESSED NUTRITION NEEDS  Estimated Energy Needs: 1275 - 1490 kcals/day (30 - 35 kcals/kg )  Justification: Underweight  Estimated Protein Needs: 43 - 51 grams protein/day (1 - 1.2 grams of pro/kg)  Justification: Repletion  Estimated Fluid Needs: 1275 - 1490 mL/day (1 mL/kcal)   Justification: Maintenance    PHYSICAL FINDINGS  See malnutrition section below.  No abnormal nutrition-related physical findings observed.     MALNUTRITION  % Intake: < 75% for >/= 3 months (moderate)  % Weight Loss: Weight loss does not meet criteria  Subcutaneous Fat Loss: Facial region:  Moderate  Muscle Loss: Temporal:  Moderate  Fluid Accumulation/Edema: None noted  Malnutrition Diagnosis: Moderate malnutrition in the context of social/environmental and chronic disease    NUTRITION DIAGNOSIS  Inadequate energy intake related to food insecurity as evidenced by pt self report, failure to maintain weight, low BMI      INTERVENTIONS  Implementation    Medical food supplement therapy  Modify composition of meals/snacks     Goals  Patient to consume % of nutritionally adequate meal trays TID, or the equivalent with supplements/snacks.     Monitoring/Evaluation  Progress toward goals will be monitored and evaluated per protocol.  Jolene Broderick RDN LD  Mental Health Pager (M-F): [phone]  On Call Pager (weekends only): [phone]

## 2023-08-17 NOTE — PLAN OF CARE
Problem: Suicidal Behavior  Goal: Suicidal Behavior is Absent or Managed  Outcome: Progressing   Goal Outcome Evaluation:    Plan of Care Reviewed With: patient        Patient is A&O x 3, flat affect with bight moods on approach.Calm and cooperative during the assessment. Reported some back pain and left arm discomfort. Uses an inhaler, did not use it stated only use it as needed.  Reported had not had a BM in 5 days, passing flatus. Good appetite, did not eat much because what ever was sent was not her favorite. Pt was reassured will order what ever she like for tomorrow.  Pt also requested for a double portion was ordered.  /71 (BP Location: Left arm)   Pulse 90   Temp 98.6  F (37  C) (Oral)   Resp 18   Ht 1.524 m (5')   Wt 42.5 kg (93 lb 12.8 oz)   LMP 09/15/2014   SpO2 100%   BMI 18.32 kg/m    1900; patient is noticed in milieu watching TV with peers  minimal or no interaction.     2000: Staff notified writer that pt was getting irritable and agitated because she asked for her T Shirt and shampoo plus her wrist  splint and would not get it . Writer went in to check if she could take her inhaler she refused it and was upset she was lying on her bed with her room littered with torn pieces of papers. Pt was asked what had happened to her floor she said she got upset and tore her bill of rights.  Later patient was allowed to look through her belongings , came back angry in tears stating her expensive shampoo was missing. Pt was reassured. Given her night med plus the PRN Zyprexa 10 mg and Hydroxyzine 50 mg . Pt was offered trazodone, she  declined it.  Patient finally went to her room . Staff will continue to monitor and update changes.

## 2023-08-17 NOTE — PLAN OF CARE
Problem: Sleep Disturbance  Goal: Adequate Sleep/Rest  Outcome: Progressing   Goal Outcome Evaluation:  [id number]: Pt sleeping at the start of the shift in no apparent distress. Currently still sleeping.Safety in place.

## 2023-08-17 NOTE — H&P
[facility], [place]   Psychiatric History and Physical.    Chief complaint and reason for admission:     Madison Abrams presents to the ED with family/friends. Patient is presenting to the ED for the following concerns: Depression, Suicidal ideation.   Factors that make the mental health crisis life threatening or complex are:  Recent loss, homelessness, suicidal ideation.     History of present illness: as per DEC assessment: Patient reports a long mental health history with several inpatient hospitalizations and ECT treatment. Patient reports making a commitment to her children that if her depression yeah to a level where it has been previously he would heed their advice and come into the hospital. Patient has recently been staying with her daughter and with her daughter observing her for several days she encouraged pt to come to the hospital to seek help. Patient has been experiencing frequent suicidal ideation for the past two months and resent homicidal ideation towards a woman that she perceives as wronged her. Patient will not name this woman or give her location but she is adamant that if she was to encounter this woman she would kill her and and end her life with suicide by . Patient reports  I can be in care home or in the grave it doesn't matter to me.  patient reports that her thoughts have become more intense since the disappearance of her boyfriend one month ago. Patient states that her boyfriend was dropped off in [place] to work but has not been seen since he was dropped off. Patient reports that because of the boyfriend's dealings with this woman he has a warrant out for his arrest and before he left he expressed to patient that he was suicidal. Patient believes that her boyfriend has ended his life and she holds this woman responsible. 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 Patient endorses feelings of hopelessness, high anxiety, depressed mood, agitation and concerns with her appetite and sleep.    During visit with this provider and PA student: patient presented as pretty cooperative and pleasant. She reported being under a considerate stress such as threats by her son to kill her, being homeless, BF disappearing more than one month ago. 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 She is concerned that boyfriend might have committed suicide. Madison openly told us that she has been living out of her car or in a barn in [place] where she was recently at or with one of her children and has no her own place. She admitted to not sleeping well and eating little and smoking meth daily and few times per day. Reported significant depression and not taking her home meds: \"I didn't have insurance\". She reported having thoughts of harming the above woman and Suicidal ideation, but denied having those thoughts at this hospital and contracted for safety. She openly stated that she would like to go to residential CD treatment and stay at this hospital voluntarily.    Shortly after visit with the patient we were informed by [facility] that [facility] didn't support petition for commitment.     Past psychiatric history: significant for diagnoses of Depression, Anxiety Disorder, Bipolar Disorder with history of hypomanic symptoms. Denies any history of psychosis. Was per chart review treated with many SSRIs, found the most helpful med Wellbutrin XL. As per above history of meth use which is a drug of choice. Describes self as a moderate alcohol drinker: couple of beers per week. Went through CD treatment programs number of times. Has a history of prior Suicide attempts.      Past medical history: COPD, endometriosis, lumbar spinal stenosis with chronic " low back pain, status post lumbar spinal fusion.   Diagnosis Date    ADD (attention deficit disorder with hyperactivity)      Anxiety      Bipolar affective disorder (H)      Chemical dependency (H)      COPD with emphysema (H)      Depressive disorder      H/O recurrent vertebral fractures       3x all assoicated with significant mechanism of injury    Spontaneous pneumothorax      Past Surgical History         Past Surgical History:   Procedure Laterality Date     SECTION        KNEE SURGERY        LAPAROSCOPIC TUBAL LIGATION            Family and social history: she has been  for a long time. Has 3 alive biological children with one grandchild and one  adopted child. 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 Currently homeless. Maternal grandfather and maternal uncle committed suicide. Her brother and mother had problems with depression and chemical dependence.          Medications:      albuterol  2 puff Inhalation 4x Daily    buPROPion  150 mg Oral Daily    gabapentin  300 mg Oral At Bedtime    lithium  300 mg Oral BID    multivitamin w/minerals  1 tablet Oral Daily    QUEtiapine  150 mg Oral At Bedtime          Allergies:   No Known Allergies       Labs:     Recent Results (from the past 24 hour(s))   Lipid panel    Collection Time: 23  7:13 AM   Result Value Ref Range    Cholesterol 230 (H) <200 mg/dL    Triglycerides 115 <150 mg/dL    Direct Measure HDL 50 >=50 mg/dL    LDL Cholesterol Calculated 157 (H) <=100 mg/dL    Non HDL Cholesterol 180 (H) <130 mg/dL   TSH with free T4 reflex and/or T3 as indicated    Collection Time: 23  7:13 AM   Result Value Ref Range    TSH 0.57 0.30 - 4.20 uIU/mL   Hemoglobin A1c    Collection Time: 23  7:13 AM   Result Value Ref Range    Hemoglobin A1C 6.1 (H) <5.7 %          Psychiatric Examination:     /71 (BP Location: Left arm)    Pulse 90   Temp 98  F (36.7  C) (Temporal)   Resp 18   Ht 1.524 m (5')   Wt 42.8 kg (94 lb 5.7 oz)   LMP 09/15/2014   SpO2 96%   BMI 18.43 kg/m    Weight is 94 lbs 5.71 oz  Body mass index is 18.43 kg/m .                Sitting Orthostatic BP: 115/77      Sitting Orthostatic Pulse: 75 bpm      Standing Orthostatic BP: 102/72      Standing Orthostatic Pulse: 80 bpm       Appearance: dressed in hospital scrubs, appeared as age stated, and thin  Attitude:  cooperative  Eye Contact:  fair  Mood:  anxious and sad   Affect:  constricted mobility  Speech:  clear, coherent  Psychomotor Behavior:  no evidence of tardive dyskinesia, dystonia, or tics and intact station, gait and muscle tone  Throught Process:  linear and goal oriented  Associations:  no loose associations  Thought Content:  no evidence of psychotic thought and passive suicidal ideation present  Insight:  partial  Judgement:  fair  Oriented to:  time, person, and place  Attention Span and Concentration:  fair  Recent and Remote Memory:  fair       Review of systems:     For physical examination and 12 point review of system please, see note of Dr. Xavier from 8/10/23.  I reviewed that note and agree with it.         DIagnoses:     Major depressive disorder, recurrent, severe ws Bipolar affective disorder, II depressed.  Stimulant use disorder (meth); moderate malnutrition.         Plan:     Patient is cooperative and petition was not supported by the [facility]. Will discontinue 72 hour hold and patient will sign in voluntarily. Will ask for CD consult. Will continue on PTA meds. She was seen by dietary, found to have moderate malnutrition: double portions ordered. Will continue to provide support and structure.      Total time spent was 56 minutes. Over 50% of times was spent counseling and coordination of care regarding coping skills, medication and discharge planning.

## 2023-08-17 NOTE — PLAN OF CARE
Assessment/Intervention/Current Symtoms and Care Coordination:  Chart review and met with team, discussed pt progress, symptomology, and response to treatment.  Discussed the discharge plan and any potential impediments to discharge.    Tasks Completed:  - Met with pre-petition screener with [facility], who shared that pt is interested in CD treatment and does not have insurance. CTC will ask provider to order a CD consult and get in touch with the financial counselors.  - Asked provider to order a CD consult for pt.  - Pre-petition screener called and shared they will not be supporting the petition. She reported that she will work on establishing a [facility]  for pt.   - Notified [facility] and provider of status change so pt can sign in voluntarily.     Discharge Plan or Goal:  Current plan is to stabilize symptoms and develop safe disposition plan. Following hospitalization, pt has expressed interest in substance use treatment.     Barriers to Discharge:  Pt was recently admitted and needs further symptom stabilization and medication management.     Referral Status:  Pt has received an order for a CD consult. Following consult, referrals for substance use treatment will be placed. Referral for [facility].     Legal Status:  Pt is voluntary    Contacts:  Prepetition Screener ([facility])  Elaina Eubanks  Phone: [phone]  Email: [email or web address].     Upcoming Meetings and Dates/Important Information and next steps:  N/A    Team Note Due:  Thursday

## 2023-08-17 NOTE — PLAN OF CARE
"Goal Outcome Evaluation:    Plan of Care Reviewed With: patient      Problem: Adult Behavioral Health Plan of Care  Goal: Adheres to Safety Considerations for Self and Others  Flowsheets (Taken 8/17/2023 7402)  Adheres to Safety Considerations for Self and Others: making progress toward outcome     Pt has been withdrawn and isolative to her room sleeping/resting. Intermittently visible in the lounge making phone calls and request. Pt presents with a blunted flat affect. Mood is irritable, suspicious and guarded. Thought process is relevant and speech is coherent. Pt requested and received PRN Zyprexa 10 mg  with her morning medication for agitation. Declined her morning multivitamin and albuterol inhaler. Pt would prefer children's multivitamin instead and her inhaler switched to PRN. Doctor notified. Pt reports endorsing depression and anxiety at a 4/10.  Expressed feeling frustrated due to her belongings being missing. Pt states she is missing a $200 perfume , a makeup kit and \"expensive shampoo and conditional\" she came into the hospital with. She reports using these items when she took a shower while in the BEC. Writer tried contacting the [facility] to follow-up on missing items , but was unable to get a hold of someone .Will notify incoming shift. Pt Denies SI/SIB/HI/AVH. Pt later requested and received prn hydroxyzine 50 mg for \"high anxiety\".  At 1314 pt approached another nurse and requested PRN Zyprexa for agitation. Writer checked in with pt who reported medications were effective. Pt hygiene is unkept, shower encouraged. States she needs her shampoo for a shower . No behavioral  concerns.     "

## 2023-08-17 NOTE — CARE PLAN
NONATTENDANCE OCCUPATIONAL THERAPY     08/17/23 1300   General Information   Has Not Attended OT as of: 08/17/23     Pt has not attended scheduled occupational therapy sessions. Encourage attendance and participation. Pt will be given self-assessment form, and OT staff will explain the purpose of including them in their treatment plan and offer options for meeting their needs.

## 2023-08-18 PROCEDURE — 250N000013 HC RX MED GY IP 250 OP 250 PS 637: Performed by: FAMILY MEDICINE

## 2023-08-18 PROCEDURE — 124N000002 HC R&B MH UMMC

## 2023-08-18 PROCEDURE — 99232 SBSQ HOSP IP/OBS MODERATE 35: CPT | Performed by: PSYCHIATRY & NEUROLOGY

## 2023-08-18 PROCEDURE — 250N000013 HC RX MED GY IP 250 OP 250 PS 637: Performed by: PSYCHIATRY & NEUROLOGY

## 2023-08-18 PROCEDURE — 250N000013 HC RX MED GY IP 250 OP 250 PS 637

## 2023-08-18 PROCEDURE — 250N000013 HC RX MED GY IP 250 OP 250 PS 637: Performed by: EMERGENCY MEDICINE

## 2023-08-18 PROCEDURE — G0177 OPPS/PHP; TRAIN & EDUC SERV: HCPCS

## 2023-08-18 RX ORDER — QUETIAPINE 200 MG/1
200 TABLET, FILM COATED, EXTENDED RELEASE ORAL AT BEDTIME
Status: DISCONTINUED | OUTPATIENT
Start: 2023-08-18 | End: 2023-08-23 | Stop reason: HOSPADM

## 2023-08-18 RX ORDER — ACYCLOVIR 50 MG/G
OINTMENT TOPICAL
Status: DISCONTINUED | OUTPATIENT
Start: 2023-08-18 | End: 2023-08-23 | Stop reason: HOSPADM

## 2023-08-18 RX ADMIN — GABAPENTIN 300 MG: 300 CAPSULE ORAL at 21:09

## 2023-08-18 RX ADMIN — HYDROXYZINE HYDROCHLORIDE 50 MG: 25 TABLET, FILM COATED ORAL at 12:48

## 2023-08-18 RX ADMIN — ACYCLOVIR: 50 OINTMENT TOPICAL at 17:02

## 2023-08-18 RX ADMIN — NICOTINE POLACRILEX 4 MG: 4 GUM, CHEWING BUCCAL at 12:48

## 2023-08-18 RX ADMIN — OLANZAPINE 10 MG: 10 TABLET, FILM COATED ORAL at 21:50

## 2023-08-18 RX ADMIN — BUPROPION HYDROCHLORIDE 150 MG: 150 TABLET, FILM COATED, EXTENDED RELEASE ORAL at 08:25

## 2023-08-18 RX ADMIN — LITHIUM CARBONATE 300 MG: 300 CAPSULE, GELATIN COATED ORAL at 08:25

## 2023-08-18 RX ADMIN — HYDROXYZINE HYDROCHLORIDE 50 MG: 25 TABLET, FILM COATED ORAL at 21:50

## 2023-08-18 RX ADMIN — NICOTINE POLACRILEX 4 MG: 4 GUM, CHEWING BUCCAL at 14:47

## 2023-08-18 RX ADMIN — LITHIUM CARBONATE 300 MG: 300 CAPSULE, GELATIN COATED ORAL at 21:09

## 2023-08-18 RX ADMIN — ACYCLOVIR: 50 OINTMENT TOPICAL at 21:09

## 2023-08-18 RX ADMIN — QUETIAPINE FUMARATE 200 MG: 200 TABLET, EXTENDED RELEASE ORAL at 21:09

## 2023-08-18 ASSESSMENT — ACTIVITIES OF DAILY LIVING (ADL)
ADLS_ACUITY_SCORE: 34
ADLS_ACUITY_SCORE: 34
ADLS_ACUITY_SCORE: 33
ADLS_ACUITY_SCORE: 34
ADLS_ACUITY_SCORE: 34
ORAL_HYGIENE: INDEPENDENT
HYGIENE/GROOMING: INDEPENDENT
LAUNDRY: WITH SUPERVISION
ADLS_ACUITY_SCORE: 33
ADLS_ACUITY_SCORE: 34
ORAL_HYGIENE: INDEPENDENT
ADLS_ACUITY_SCORE: 33
ADLS_ACUITY_SCORE: 34
DRESS: STREET CLOTHES;INDEPENDENT
ADLS_ACUITY_SCORE: 34
DRESS: STREET CLOTHES;INDEPENDENT
HYGIENE/GROOMING: INDEPENDENT

## 2023-08-18 NOTE — PLAN OF CARE
"  Problem: Adult Behavioral Health Plan of Care  Goal: Adheres to Safety Considerations for Self and Others  Outcome: Progressing  Intervention: Develop and Maintain Individualized Safety Plan  Recent Flowsheet Documentation  Taken 8/18/2023 1139 by Prudence Chamberlain RN  Safety Measures:   clinical history reviewed   safety rounds completed   suicide check-in completed   Goal Outcome Evaluation:    Plan of Care Reviewed With: patient          Patient was visible on the unit, socially withdrawn to self. Patient presents with a flat affect but bright upon approach, patient reports feeling frustrated due to her missing hygiene and make-up products that were in BEC before she got transferred to station 30. Staff RN called [facility] and they were able to find the hygiene/make-up products. That made a huge impact on the patient's outlook. Patient brightened up. Took a shower, blow-dried her hair and has been happy. Patient however endorsed anxiety and depression, requested and received PRN Hydroxyzine 50mg with effect. Upon assessing the patient for suicidal ideation, the patient responded \"not at this time\". No SIB was reported by the patient or observed by the staff RN. Patient denied HI, AVH. Patient denied pain. Insight is limited. Eating and drinking adequately, medication compliant, no side effects of medication was reported by the patient or observed by the staff RN. Patient attended groups this shift. No agitation, behavioral outbursts or acute safety concerns this shift. VSS. Staff will continue to follow the plan of care.           "

## 2023-08-18 NOTE — PROGRESS NOTES
"[facility], [place]   Psychiatric Progress Note        Interim History:   The patient's care was discussed with the treatment team during the daily team meeting and/or staff's chart notes were reviewed.  Staff report patient slept for 5.25 hours and reported feeling dizzy last night, see RN's note below. She went to groups and was cooperative with her meds. She was upset that her perfume and some hygiene items were missing, but felt much better when security found missing items. She signed in as a voluntary patient. CD consult was acknowledged, CD service promised to do it on Monday or Tuesday.    RN's note: \"Patient woke up at 0200 to get drinking water. On approach, tells writer\" I feel like I am going to pass out\". Assisted and supported patient into a sitting position. Vital signs taken and patient negative orthostatic. VS stable at this time. Offered 2 cups orange juice and some snacks. Walked patient to room/ bed with 2 person assist. Safety maintained and no further complaints voiced. Continue to monitor.Slept for 5.25 hours.\"    Met with patient: she was seen in her room. Madison appeared to be in a good mood and joked at times appropriately to the situation. She confirmed that she would like to get chewable multivitamins which would not be hard for her stomach, requested Zovirax for labial herpes. She complained of difficulties with waking up in the middle of night, said that she can't tolerate Trazodone. Agreed to increase her dose of Seroquel. Overall, she appeared to be more positive. Confirmed that she would like to go to CD treatment and stay at this hospital voluntarily. She denied presence of Suicidal ideation and Homicidal thoughts. When asked specifically if she has any history of physical violence, Madison denied that: \"only with my words when I get angry\".          Medications:      acyclovir   Topical Q3H    albuterol  2 puff Inhalation 4x Daily    buPROPion  150 mg " Oral Daily    [START ON 8/19/2023] childrens multivitamin with iron  1 tablet Oral Daily    gabapentin  300 mg Oral At Bedtime    lithium  300 mg Oral BID    QUEtiapine  200 mg Oral At Bedtime          Allergies:   No Known Allergies       Labs:   No results found for this or any previous visit (from the past 24 hour(s)).       Psychiatric Examination:     /79 (BP Location: Left arm, Patient Position: Sitting, Cuff Size: Adult Regular)   Pulse 100   Temp 97.4  F (36.3  C) (Oral)   Resp 17   Ht 1.524 m (5')   Wt 42.8 kg (94 lb 5.7 oz)   LMP 09/15/2014   SpO2 99%   BMI 18.43 kg/m    Weight is 94 lbs 5.71 oz  Body mass index is 18.43 kg/m .    Orthostatic Vitals         Most Recent      Sitting Orthostatic /79 08/18 1139    Sitting Orthostatic Pulse (bpm) 100 08/18 1139    Standing Orthostatic /76 08/18 1139    Standing Orthostatic Pulse (bpm) 103 08/18 1139          Appearance: dressed in hospital scrubs and appeared as age stated  Attitude:  more cooperative  Eye Contact:  fair  Mood:  anxious, sad , and better  Affect:  full range  Speech:  clear, coherent and normal prosody  Psychomotor Behavior:  no evidence of tardive dyskinesia, dystonia, or tics  Throught Process:  linear and goal oriented  Associations:  no loose associations  Thought Content:  no evidence of suicidal ideation or homicidal ideation and no evidence of psychotic thought  Insight:  partial  Judgement:  fair  Oriented to:  time, person, and place  Attention Span and Concentration:  fair  Recent and Remote Memory:  fair    Clinical Global Impressions  First: 5/3 8/18/2023      Most recent:            Precautions:     Behavioral Orders   Procedures    Code 1 - Restrict to Unit    Discontinue 1:1 attendant for suicide risk     Order Specific Question:   I have performed an in person assessment of the patient     Answer:   Based on this assessment the patient no longer requires a one on one attendant at this point in time.      Order Specific Question:   Rationale     Answer:   Patient States able to remain safe in hospital     Order Specific Question:   Rationale     Answer:   Modifications to care environment made to mitigate safety risk     Order Specific Question:   Rationale     Answer:   Routine observations are sufficient to monitor safety.    Routine Programming     As clinically indicated    Status 15     Every 15 minutes.          DIagnoses:     Major depressive disorder, recurrent, severe ws Bipolar affective disorder, II depressed.  Stimulant use disorder (meth);   Moderate malnutrition.         Plan:     Will replace regular multivitamins with chewable, start Zovirax prn for herpes outbreak, increase HS Seroquel dose. CD consult ordered, will see her early next week. Will continue to provide support and structure.      Total time spent was 38 minutes. Over 50% of times was spent counseling and coordination of care regarding coping skills, medication and discharge planning.

## 2023-08-18 NOTE — CARE PLAN
08/18/23 1436   Group Therapy Session   Group Attendance attended group session   Time Session Began 1115   Time Session Ended 1200   Total Time (minutes) 45   Total # Attendees 6   Group Type life skill;other (see comments)  (OT)   Group Topic Covered balanced lifestyle;coping skills/lifestyle management;structured socialization   Group Session Detail OT - Coping:  Focus of group was on identification of specific coping skills for mental health management using the game of pictionary. The process also includes practice of brainstorming techniques and communication skills in a group setting, listening to others and sharing ideas.   Patient Response/Contribution able to recall/repeat info presented;cooperative with task;discussed personal experience with topic;expressed understanding of topic;listened actively   Patient Participation Detail Pt easily engaged in the drawing, guessing and discussion. Noted several coping skills she has use, yet also noted she uses unhealthy coping skills. Did initiate sharing about teaching her children when young about breaking dishes in safe manner to deal with pent up emotions. Was able to also discuss how to modify this and also work towards using other types of coping skills.

## 2023-08-18 NOTE — PROGRESS NOTES
"   08/17/23 2003   Group Therapy Session   Group Attendance attended group session   Time Session Began 1720   Time Session Ended 1800   Total Time (minutes) 40   Total # Attendees 6   Group Type psychotherapeutic   Group Topic Covered cognitive therapy techniques;structured socialization   Group Session Detail self compassion and healthy habits   Patient Response/Contribution cooperative with task;confused;became angry or agitated;discussed personal experience with topic;expressed reluctance to alter behaviors   Patient Participation Detail mood good,\" not being put on a committment and best friend got out of alf today after 11 years. She was contrary and irritable but may have been due to another pt leading the disregulation of pts in group. She did color which helped with calming her.       "

## 2023-08-18 NOTE — DISCHARGE INSTRUCTIONS
Behavioral Discharge Planning and Instructions    Summary: You were admitted on 8/10/2023  due to Suicidal Ideations and Homicidal Ideations/Threatening Behaviors.  You were treated by Nitin Lberon MD and discharged on 8/23/23 from [facility] to Substance Abuse Treatment Program [facility].    Main Diagnosis:   Stimulant use disorder (methamphetamine)  Major depressive disorder, recurrent, severe (rule out)  Bipolar affective disorder, II depressed (rule out)    Commitment: During your stay, civil commitment paperwork was filed with [facility]. However, the petition was not supported by the Counts include 234 beds at the [facility]. Therefore, you are not under civil commitment and remained in the hospital voluntarily.    Health Care Follow-up:   You will be discharging to [facility] residential substance use treatment program. There, you will receive 30 hours of treatment per week including group therapy, individual counseling and spiritual care. Patients also enjoy [facility] faculty lectures that provide information about the science of addiction and mental health. Evenings include peer recovery support meetings and structured activities.   [facility] (Ask for location at Information Desk in [facility]) or  Go to the [address] [place] [address]  P: [phone]    Psychiatry appointment: Tuesday, September 19, 2023 @ 9:45am  Provider: Sandor Sanders MD  [facility]  [address] [place] [address]  **The building and floor location has changed since your last appointment.   Phone: [phone]  Fax: [phone]  [facility]UC TO FAX AVS to above appointment, please    You have been referred to mental health case management by [facility]. Your Counts include 234 beds at the [facility]  is as follows:   Name: NILO Bach  Phone: [phone]    During your  "inpatient stay, financial counselors worked with you to establish Medicaid coverage. This was backdated to 12/1/22 and you will be due for renewal in January 2024. Your coverage information is as follows:     [place] 04/22/[place]    Subscriber Relation to Pt Subscriber #   Madison Abrams Self [id number]   [facility] #      Address Phone   [address] [address]  [place] [id number] [phone]     Policy Number Effective Date   [id number] 12/1/22       Attend all scheduled appointments with your outpatient providers. Call at least 24 hours in advance if you need to reschedule an appointment to ensure continued access to your outpatient providers.     Major Treatments, Procedures and Findings:  You were provided with: a psychiatric assessment, assessed for medical stability, medication evaluation and/or management, group therapy, family therapy, individual therapy, CD evaluation/assessment, milieu management, and medical interventions    Symptoms to Report: feeling more aggressive, increased confusion, losing more sleep, mood getting worse, or thoughts of suicide    Early warning signs can include: increased depression or anxiety sleep disturbances increased thoughts or behaviors of suicide or self-harm     Safety and Wellness:  Take all medicines as directed.  Make no changes unless your doctor suggests them.      Follow treatment recommendations.  Refrain from alcohol and non-prescribed drugs.  Ask your support system to help you reduce your access to items that could harm yourself or others. Items could include:  Firearms  Medicines (both prescribed and over-the-counter)  Knives and other sharp objects  Ropes and like materials  Car keys  If there is a concern for safety, call 911. If there is a concern for safety, call 911.    Resources:   Crisis Intervention: 449.217.9170 or 432-969-4638 (TTY: 255.329.4215).  Call anytime for help.  Text 4 Life: txt \"LIFE\" to [address]96[address] for immediate support and crisis " "intervention  Crisis text line: Text \"[place]\" to 816983. Free, confidential, 24/7.  [facility] Mental Health Crisis Team:  [phone]  [employer] [place] ([employer] [place] [employer]) improves the lives of children and adults with mental illnesses and their families by providing free classes on mental illnesses and support groups for adults with mental illnesses, parents and family members. For more information: Phone: 148.818.7039 Toll free: 6-149-EUOI-mymxlog Website: www.[employer].orghttp://www.namihps.org/    General Medication Instructions:   See your medication sheet(s) for instructions.   Take all medicines as directed.  Make no changes unless your doctor suggests them.   Go to all your doctor visits.  Be sure to have all your required lab tests. This way, your medicines can be refilled on time.  Do not use any drugs not prescribed by your doctor.  Avoid alcohol.    Advance Directives:   Scanned document on file with [facility]? No scanned doc  Is document scanned? Pt states no documents  Honoring Choices Your Rights Handout: Informed and given  Was more information offered? Pt declined    The Treatment team has appreciated the opportunity to work with you. If you have any questions or concerns about your recent admission, you can contact the unit which can receive your call 24 hours a day, 7 days a week. They will be able to get in touch with a Provider if needed. The unit number is [phone] .    "

## 2023-08-18 NOTE — PLAN OF CARE
"  Problem: Sleep Disturbance  Goal: Adequate Sleep/Rest  Outcome: Progressing   Goal Outcome Evaluation:Patient woke up at 0200 to get drinking water. On approach, tells writer\" I feel like I am going to pass out\". Assisted and supported patient into a sitting position. Vital signs taken and patient negative orthostatic. VS stable at this time. Offered 2 cups orange juice and some snacks. Walked patient to room/ bed with 2 person assist. Safety maintained and no further complaints voiced. Continue to monitor.Slept for 5.25 hours  "

## 2023-08-18 NOTE — CONSULTS
"SPIRITUAL HEALTH SERVICES  SPIRITUAL ASSESSMENT Progress Note  [facility] ([facility]) [facility]  ON-CALL     REFERRAL SOURCE: Consult request    Spoke with pt over the phone who shared that she is \"Wiccan with a  Background\" and would like to be able to smudge. I explained that we cannot smudge with smoke on a locked unit but do have Native Elder-approved smudging spray created by Native Americans for Native Americans with essential oils of álvaro, cedar, and sweet grass. Pt agreed that would be a good alternative.     I informed pt that I would be in tomorrow and would bring some spray in the morning.     PLAN: Will bring smudging spray for pt Saturday 8/19. Spiritual health services remains available for any follow-up or requests     Jenny Buchanan, [facility]  Associate   Pager: [phone]    "

## 2023-08-18 NOTE — PLAN OF CARE
Assessment/Intervention/Current Symtoms and Care Coordination:  Chart review and met with team, discussed pt progress, symptomology, and response to treatment.  Discussed the discharge plan and any potential impediments to discharge.    Tasks Completed:  - Spoke to CD , Sachi Fleming. She will meet with pt next week for CD assessment.  - Left message with financial counselors to check status of MA application. Pt had an open case from 2019 that needs to be closed before they can file a new application. Financial counselor has left messages for [facility] , [place], to ask her to remove the request so new application can be submitted.   - Spoke to pt regarding CD  coming next week. Pt reported that she is disappointed they can't come sooner but understands. Pt shared she is interested in [facility] as she went there previously and had a long period of sobriety following. Pt signed MANJEET for [facility] program (in chart). Pt asked if she could get a day pass to participate in a Pow Wow ceremony in [place]. CTC explained that she would not be able to get a day pass due to program rules. Pt asked if she could smudge in her room instead. [facility] asked HUC to notify pt evening nurse to put in request in [facility] to assist pt with this request. Notified pt that request was made with [facility].     Discharge Plan or Goal:  Current plan is to stabilize symptoms and develop safe disposition plan. Following hospitalization, pt has expressed interest in substance use treatment - [facility].      Barriers to Discharge:  Pt is awaiting CD assessment for substance use treatment placement.     Referral Status:  Pt has received an order for a CD consult. Following consult, referrals will be placed for [facility] (MANJEET in pt chart). Referral for [facility] Case Management will be placed by prepetition screener.     Legal Status:  Pt is  voluntary    Contacts:  Prepetition Screener ([facility])  Elaina Eubanks  Phone: [phone]  Email: [email or web address].     Upcoming Meetings and Dates/Important Information and next steps:  N/A    Team Note Due:  Thursday

## 2023-08-18 NOTE — PROGRESS NOTES
8/18/2023    CD consult acknowledged.  [facility] department does not do evals on the weekend and schedule is full for today.  Pt will be seen Monday or Tuesday for CD eval. If pt discharges this weekend, pt can call Mental Health and Addiction Services Line: [phone] and make an appt through [employer] for an evaluation and referrals to CD treatment.    Sachi Fleming [facility]  Phone: [phone]  Email: [email or web address]

## 2023-08-18 NOTE — PLAN OF CARE
Problem: Plan of Care - These are the overarching goals to be used throughout the patient stay.    Goal: Absence of Hospital-Acquired Illness or Injury  Outcome: Progressing        Patient presents with a flat, depressed affect, and was resting in bed when first approach. Patient reported that she felt very anxious earlier today, but was feeling much better after taking PRN medication, and is just feeling tired now. Patient continues to report feeling frustrated that she has not been able to find her hygiene products that were with her in the [facility]. She is also reports being frustrated that her daughter did not know she was here for 5 days. Patient states that she doesn't want to shower until her own hygiene items are found. Mood has otherwise been calm and pleasant. Patient denies anxiety or depression at this time. Patient noted to respond to humor on occasion. Patient came out of room to watch TV, attended part of group and ate dinner. Patient has been cooperative with taking all scheduled medications except for albuterol inhaler. PRN hydroxyzine requested and given at bedtime. Patient denies SI/SIB/HI/AH/VH and contracts for safety.

## 2023-08-18 NOTE — CARE PLAN
08/18/23 1430   Group Therapy Session   Group Attendance attended group session   Time Session Began 1015   Time Session Ended 1105   Total Time (minutes) 50   Total # Attendees 5   Group Type life skill;task skill;other (see comments)  (OT)   Group Topic Covered balanced lifestyle;coping skills/lifestyle management;leisure exploration/use of leisure time;structured socialization   Group Session Detail OT Clinic: Pt actively participated in occupational therapy clinic to facilitate coping skill exploration, creative expression within personally meaningful activities, and clinical observation of social, cognitive, and kinesthetic performance skills.    Patient Response/Contribution able to recall/repeat info presented;cooperative with task;discussed personal experience with topic;expressed understanding of topic   Patient Participation Detail Pt  shared about her interest in drawing and asked about materials, while noting she has her own supplies in her locker. She was open to discuss and make a compromise with staff to get a few of her drawing pencils and pad to keep in the OT patient cupboard to use only in group. She then did use materials in the group and spent session drawing. She noted that she tends to draw and erase a lot. At times, she appeared anxious with slight fast and pressured movements to her drawing. She was social with others and offered positive feedback. See also BEH Flowsheet for assessment.

## 2023-08-19 LAB — LITHIUM SERPL-SCNC: 1.1 MMOL/L (ref 0.6–1.2)

## 2023-08-19 PROCEDURE — 250N000013 HC RX MED GY IP 250 OP 250 PS 637: Performed by: PSYCHIATRY & NEUROLOGY

## 2023-08-19 PROCEDURE — 36415 COLL VENOUS BLD VENIPUNCTURE: CPT | Performed by: PSYCHIATRY & NEUROLOGY

## 2023-08-19 PROCEDURE — 250N000013 HC RX MED GY IP 250 OP 250 PS 637: Performed by: FAMILY MEDICINE

## 2023-08-19 PROCEDURE — 124N000002 HC R&B MH UMMC

## 2023-08-19 PROCEDURE — 250N000013 HC RX MED GY IP 250 OP 250 PS 637: Performed by: EMERGENCY MEDICINE

## 2023-08-19 PROCEDURE — 80178 ASSAY OF LITHIUM: CPT | Performed by: PSYCHIATRY & NEUROLOGY

## 2023-08-19 PROCEDURE — 250N000013 HC RX MED GY IP 250 OP 250 PS 637

## 2023-08-19 RX ADMIN — HYDROXYZINE HYDROCHLORIDE 50 MG: 25 TABLET, FILM COATED ORAL at 13:55

## 2023-08-19 RX ADMIN — BUPROPION HYDROCHLORIDE 150 MG: 150 TABLET, FILM COATED, EXTENDED RELEASE ORAL at 08:48

## 2023-08-19 RX ADMIN — OLANZAPINE 10 MG: 10 TABLET, FILM COATED ORAL at 17:38

## 2023-08-19 RX ADMIN — LITHIUM CARBONATE 300 MG: 300 CAPSULE, GELATIN COATED ORAL at 20:16

## 2023-08-19 RX ADMIN — ACYCLOVIR: 50 OINTMENT TOPICAL at 08:49

## 2023-08-19 RX ADMIN — LITHIUM CARBONATE 300 MG: 300 CAPSULE, GELATIN COATED ORAL at 08:54

## 2023-08-19 RX ADMIN — Medication 1 TABLET: at 08:48

## 2023-08-19 RX ADMIN — ACYCLOVIR: 50 OINTMENT TOPICAL at 13:04

## 2023-08-19 RX ADMIN — NICOTINE POLACRILEX 4 MG: 4 GUM, CHEWING BUCCAL at 14:04

## 2023-08-19 RX ADMIN — GABAPENTIN 300 MG: 300 CAPSULE ORAL at 20:16

## 2023-08-19 RX ADMIN — HYDROXYZINE HYDROCHLORIDE 50 MG: 25 TABLET, FILM COATED ORAL at 20:16

## 2023-08-19 RX ADMIN — HYDROXYZINE HYDROCHLORIDE 25 MG: 25 TABLET, FILM COATED ORAL at 08:56

## 2023-08-19 RX ADMIN — QUETIAPINE FUMARATE 200 MG: 200 TABLET, EXTENDED RELEASE ORAL at 20:16

## 2023-08-19 ASSESSMENT — ACTIVITIES OF DAILY LIVING (ADL)
ADLS_ACUITY_SCORE: 33
ADLS_ACUITY_SCORE: 34
HYGIENE/GROOMING: HANDWASHING;SHOWER;INDEPENDENT
ADLS_ACUITY_SCORE: 34
ADLS_ACUITY_SCORE: 34
ADLS_ACUITY_SCORE: 33
DRESS: STREET CLOTHES;SCRUBS (BEHAVIORAL HEALTH);INDEPENDENT
ADLS_ACUITY_SCORE: 34
ADLS_ACUITY_SCORE: 33
LAUNDRY: WITH SUPERVISION
ADLS_ACUITY_SCORE: 33
ADLS_ACUITY_SCORE: 34
ADLS_ACUITY_SCORE: 34
ORAL_HYGIENE: INDEPENDENT

## 2023-08-19 NOTE — PLAN OF CARE
Goal Outcome Evaluation:    Plan of Care Reviewed With: patient    Pt spent about 50% of the shift in common areas of the unit. Pt was medication compliant.  Pt spent much of the morning in her room resting in bed. Pt denied any pain discomfort. Pt reports some occasional fleeting suicidal thoughts but reports thoughts are much reduced. No plan, contracts for safety. Pt was crying when talking about boyfriend, who has gone absent since July 3rd. Pt states has been preoccupied and worried about his well being. Pt talked about family conflicts among her children. Pt stated she is glad that she is in the hospital and states she is starting to feel better. Pt reports fine hand tremor bilateral. Pt states that she has family hx of tremors in her family. Pt reports having trouble with word finding today on occasion. Pot denies hearing any voices today.

## 2023-08-19 NOTE — PLAN OF CARE
"  Problem: Depressive Signs/Symptoms  Goal: Improved Mood Symptoms (Depressive Signs/Symptoms)  Outcome: Progressing   Goal Outcome Evaluation:    Madison has been out in the television lounge watching TV for about 50% of the evening. She is well groomed with make up on. She received an order for soda, one aday before 2pm. She was very grate ful for this. She laid down for a short period of time after dinner and got up. Then laid down and fell a sleep again about 2030.She was awakened at 2115 for evening medications, she was medication compliant. At about 2150 she got out of bed and was very agitated about \"Staff keep opening my door.\" It was explained to Madison that staff are required to open the door and check on her every 15 minutes. She became very angry. She then was agreeable to taking some Zyprexa for agitation.She denies SI/SIB/AH/VH. She is anxious and gets angry very fast. She requested not to be awakened for acyclovir cream  during the night and that the Ventolin inhaler be changed to prn. Nursing to continue to support current plan of care.                      "

## 2023-08-19 NOTE — PLAN OF CARE
Patient was observed resting in room at the start of the shift. Patient slept for 7 hours. No concerns or issues noted during this shift.

## 2023-08-19 NOTE — PROGRESS NOTES
"SPIRITUAL HEALTH SERVICES Progress Note  [facility], Unit  30[address]  ON-CALL    Saw pt Madison LAZAR Aliza per follow-up for smudging request.    Distress and Loss - Pt shared that her son had  from \"congenital heart failure during COVID, so we couldn't be with him when he \" a few years ago. His name means \"the light that shines inside you\", her daughter has been telling her that \"I need to find my light inside of me\".     Strengths, Coping, and Resources - Pt finds strength in her family and her connection to her Native ancestry, \"my aunties always taught me about my heritage and how to be a witch\".     - Pt shared that she has \"been sober for a week\" so per her tradition she is finally clean enough to smudge. \"I haven't smudged in over a year, I can finally do it\".     Meaning, Beliefs, and Spirituality - Pt is \"Wiccan with a Native background, it's not something I like to put a label on\".    Pt shared that smudging is an important part of her spirituality and that \"it helps to cleanse and heal\". I provided pt with smudging spray which we used in the room, pt expressed gratitude for the spray. We also discussed the possibility of making álvaro tea or cedar tea. I assured pt I would work on making sure at least álvaro was provided to her and work on providing cedar.     Plan of Care - Will provide pt with envelope of álvaro leaves for making álvaro tea in her Native Tradition and work on procuring cedar for the same purpose. Will communicate care to unit chaplain Neri. Spiritual health services remains available for any follow-up or requests for smudging     Jenny Buchanan, [facility]  Associate   Pager: [phone]    "

## 2023-08-19 NOTE — PROGRESS NOTES
SPIRITUAL HEALTH SERVICES  SPIRITUAL ASSESSMENT Progress Note  [facility] ([facility]) 30N  ON-CALL     REFERRAL SOURCE: Materials request    Explained to patient that I provided the  with an envelope of álvaro leaves and cedar so she can make tea in accordance with her Native traditions and she can request it at any time. Pt expressed gratitude for the sacred medicines.     PLAN: Will communicate care to unit bijal Neri. Spiritual health services remains available for any follow-up or requests     Jenny Buchanan [facility]  Associate   Pager: [phone]

## 2023-08-19 NOTE — PLAN OF CARE
Problem: Depressive Signs/Symptoms  Goal: Improved Mood Symptoms (Depressive Signs/Symptoms)  Outcome: Progressing   Goal Outcome Evaluation:    Plan of Care Reviewed With: patient       Madison approached this writer and requested medication for anxiety and agitation. Tremor present as she speaks. Unable to give any Hydroxyzine as she just received some three hours ago. Patient given Zyprexa as requested. Patient so anxious that she had difficulty holding cup of water to take her medication secondary to her tremors.

## 2023-08-20 PROCEDURE — 250N000013 HC RX MED GY IP 250 OP 250 PS 637: Performed by: EMERGENCY MEDICINE

## 2023-08-20 PROCEDURE — 124N000002 HC R&B MH UMMC

## 2023-08-20 PROCEDURE — 250N000013 HC RX MED GY IP 250 OP 250 PS 637: Performed by: PSYCHIATRY & NEUROLOGY

## 2023-08-20 PROCEDURE — 250N000013 HC RX MED GY IP 250 OP 250 PS 637

## 2023-08-20 PROCEDURE — 250N000013 HC RX MED GY IP 250 OP 250 PS 637: Performed by: FAMILY MEDICINE

## 2023-08-20 RX ADMIN — SENNOSIDES AND DOCUSATE SODIUM 1 TABLET: 50; 8.6 TABLET ORAL at 20:31

## 2023-08-20 RX ADMIN — BUPROPION HYDROCHLORIDE 150 MG: 150 TABLET, FILM COATED, EXTENDED RELEASE ORAL at 08:36

## 2023-08-20 RX ADMIN — HYDROXYZINE HYDROCHLORIDE 50 MG: 25 TABLET, FILM COATED ORAL at 13:51

## 2023-08-20 RX ADMIN — ACETAMINOPHEN 975 MG: 325 TABLET, FILM COATED ORAL at 16:41

## 2023-08-20 RX ADMIN — GABAPENTIN 300 MG: 300 CAPSULE ORAL at 20:31

## 2023-08-20 RX ADMIN — NICOTINE POLACRILEX 4 MG: 4 GUM, CHEWING BUCCAL at 08:38

## 2023-08-20 RX ADMIN — OLANZAPINE 10 MG: 10 TABLET, FILM COATED ORAL at 16:47

## 2023-08-20 RX ADMIN — NICOTINE POLACRILEX 4 MG: 4 GUM, CHEWING BUCCAL at 13:51

## 2023-08-20 RX ADMIN — QUETIAPINE FUMARATE 200 MG: 200 TABLET, EXTENDED RELEASE ORAL at 20:30

## 2023-08-20 RX ADMIN — LITHIUM CARBONATE 300 MG: 300 CAPSULE, GELATIN COATED ORAL at 08:36

## 2023-08-20 RX ADMIN — Medication 1 TABLET: at 08:36

## 2023-08-20 RX ADMIN — LITHIUM CARBONATE 300 MG: 300 CAPSULE, GELATIN COATED ORAL at 20:30

## 2023-08-20 ASSESSMENT — ACTIVITIES OF DAILY LIVING (ADL)
ADLS_ACUITY_SCORE: 33
HYGIENE/GROOMING: INDEPENDENT
ADLS_ACUITY_SCORE: 33
DRESS: INDEPENDENT
ADLS_ACUITY_SCORE: 33
LAUNDRY: WITH SUPERVISION
ORAL_HYGIENE: INDEPENDENT
ADLS_ACUITY_SCORE: 33
ORAL_HYGIENE: INDEPENDENT
ADLS_ACUITY_SCORE: 33
HYGIENE/GROOMING: HANDWASHING;INDEPENDENT;SHOWER
LAUNDRY: WITH SUPERVISION
DRESS: STREET CLOTHES;INDEPENDENT
ADLS_ACUITY_SCORE: 33

## 2023-08-20 NOTE — PLAN OF CARE
Problem: Sleep Disturbance  Goal: Adequate Sleep/Rest  Outcome: Progressing     Goal Outcome Evaluation:  Patient was observed sleeping during safety rounds check. Respirations observed and breathing was normal unlabored. Patient had no c/o or discomfort during HS. Patient slept 7 hours.

## 2023-08-20 NOTE — PLAN OF CARE
Goal Outcome Evaluation:    Plan of Care Reviewed With: patient    Pt spent most of the shift in common areas of the unit. Pt was medication compliant. Pt was generally withdrawn in milieu . Pt denies any suicidal thoughts today. Pt observed watching tv and working on [employer]. Pt ate well for both breakfast and lunch. Pt is still worried about her boyfriend that is missing. Pt also reports that today is anniversary of ex husbands death. Pt stated is motivated and want cd tx hoping to have CD consult asap. Pt reports hx of 8 year sobriety 2012 - 2000. Pt reports that she believes mood was more stable during her period of sobriety.   Pt received ashleigh and sweet grass for spiritual care.

## 2023-08-20 NOTE — PLAN OF CARE
Problem: Depressive Signs/Symptoms  Goal: Improved Mood Symptoms (Depressive Signs/Symptoms)  8/19/2023 2032 by Lizabeth Mock RN  Outcome: Progressing   Goal Outcome Evaluation:    Plan of Care Reviewed With: patient      Madison has been isolated to herself all evening. She has spent time in the television lounge watching television and doing personal hygiene care. (She showered, washed her hair, blow dry etc.) She has been on the phone frequently She has been slightly irritable. She is hoping to sleep better this evening. She denied SI/SIB/AH/VH. Feels safe on unit. She complains of feeling bored. Nursing to continue to support current plan of care.

## 2023-08-21 ENCOUNTER — TELEPHONE (OUTPATIENT)
Dept: BEHAVIORAL HEALTH | Facility: CLINIC | Age: 53
End: 2023-08-21

## 2023-08-21 PROCEDURE — H0001 ALCOHOL AND/OR DRUG ASSESS: HCPCS

## 2023-08-21 PROCEDURE — 90853 GROUP PSYCHOTHERAPY: CPT

## 2023-08-21 PROCEDURE — 99231 SBSQ HOSP IP/OBS SF/LOW 25: CPT | Performed by: PSYCHIATRY & NEUROLOGY

## 2023-08-21 PROCEDURE — 250N000013 HC RX MED GY IP 250 OP 250 PS 637: Performed by: EMERGENCY MEDICINE

## 2023-08-21 PROCEDURE — 250N000013 HC RX MED GY IP 250 OP 250 PS 637: Performed by: PSYCHIATRY & NEUROLOGY

## 2023-08-21 PROCEDURE — 124N000002 HC R&B MH UMMC

## 2023-08-21 PROCEDURE — 90837 PSYTX W PT 60 MINUTES: CPT

## 2023-08-21 PROCEDURE — 250N000013 HC RX MED GY IP 250 OP 250 PS 637

## 2023-08-21 PROCEDURE — 250N000013 HC RX MED GY IP 250 OP 250 PS 637: Performed by: FAMILY MEDICINE

## 2023-08-21 RX ORDER — POLYETHYLENE GLYCOL 3350 17 G/17G
17 POWDER, FOR SOLUTION ORAL DAILY
Status: DISCONTINUED | OUTPATIENT
Start: 2023-08-21 | End: 2023-08-22

## 2023-08-21 RX ADMIN — OLANZAPINE 10 MG: 10 TABLET, FILM COATED ORAL at 19:41

## 2023-08-21 RX ADMIN — QUETIAPINE FUMARATE 200 MG: 200 TABLET, EXTENDED RELEASE ORAL at 21:17

## 2023-08-21 RX ADMIN — HYDROXYZINE HYDROCHLORIDE 50 MG: 25 TABLET, FILM COATED ORAL at 15:20

## 2023-08-21 RX ADMIN — ACETAMINOPHEN 975 MG: 325 TABLET, FILM COATED ORAL at 11:11

## 2023-08-21 RX ADMIN — POLYETHYLENE GLYCOL 3350 17 G: 17 POWDER, FOR SOLUTION ORAL at 18:12

## 2023-08-21 RX ADMIN — NICOTINE POLACRILEX 4 MG: 4 GUM, CHEWING BUCCAL at 11:11

## 2023-08-21 RX ADMIN — Medication 1 TABLET: at 08:41

## 2023-08-21 RX ADMIN — BUPROPION HYDROCHLORIDE 150 MG: 150 TABLET, FILM COATED, EXTENDED RELEASE ORAL at 08:41

## 2023-08-21 RX ADMIN — SENNOSIDES AND DOCUSATE SODIUM 1 TABLET: 50; 8.6 TABLET ORAL at 11:17

## 2023-08-21 RX ADMIN — HYDROXYZINE HYDROCHLORIDE 50 MG: 25 TABLET, FILM COATED ORAL at 08:46

## 2023-08-21 RX ADMIN — SENNOSIDES AND DOCUSATE SODIUM 1 TABLET: 50; 8.6 TABLET ORAL at 18:12

## 2023-08-21 RX ADMIN — LITHIUM CARBONATE 300 MG: 300 CAPSULE, GELATIN COATED ORAL at 21:17

## 2023-08-21 RX ADMIN — NICOTINE POLACRILEX 4 MG: 4 GUM, CHEWING BUCCAL at 08:41

## 2023-08-21 RX ADMIN — LITHIUM CARBONATE 300 MG: 300 CAPSULE, GELATIN COATED ORAL at 08:41

## 2023-08-21 RX ADMIN — NICOTINE POLACRILEX 4 MG: 4 GUM, CHEWING BUCCAL at 15:20

## 2023-08-21 RX ADMIN — GABAPENTIN 300 MG: 300 CAPSULE ORAL at 21:17

## 2023-08-21 ASSESSMENT — ACTIVITIES OF DAILY LIVING (ADL)
ADLS_ACUITY_SCORE: 34
DRESS: STREET CLOTHES;INDEPENDENT
LAUNDRY: WITH SUPERVISION
ADLS_ACUITY_SCORE: 34
ORAL_HYGIENE: INDEPENDENT
DRESS: INDEPENDENT;STREET CLOTHES
ADLS_ACUITY_SCORE: 33
ADLS_ACUITY_SCORE: 33
ADLS_ACUITY_SCORE: 34
ADLS_ACUITY_SCORE: 33
HYGIENE/GROOMING: HANDWASHING;SHOWER;INDEPENDENT
ADLS_ACUITY_SCORE: 33
ADLS_ACUITY_SCORE: 33
ORAL_HYGIENE: INDEPENDENT
HYGIENE/GROOMING: INDEPENDENT
ADLS_ACUITY_SCORE: 33

## 2023-08-21 NOTE — PLAN OF CARE
Goal Outcome Evaluation:    Plan of Care Reviewed With: patient    Pt was out in common areas of the unit more than 50% of the shift. Pt was medication compliant. Pt reported feeling anxious this morning feeling worried about having her CD assessment. Pt stated also worried that she hadn't heard from her daughter. Pt requested and received hydroxyzine 50 mg prn @ 0900. Pt reports no BM past 4 days. Pt requested and received senna PRN at around noon. Pt reported some back pain and received tylenol 950 mg and hot pack. Pt reported partial relief with intervention.  Pt had her CD eval this afternoon. Pt stated that she is excited to go to treatment. Pt denies suicidal thoughts.

## 2023-08-21 NOTE — PLAN OF CARE
Assessment/Intervention/Current Symtoms and Care Coordination:  Chart review and met with team, discussed pt progress, symptomology, and response to treatment.  Discussed the discharge plan and any potential impediments to discharge.    Tasks Completed:  - Financial counselor reached out to [facility] and shared they were able to get a hold of [facility] to clear out the old application. They are hopeful pt MA will become active today.   - CD evaluator called back regarding evaluation and asked about funding. Updated regarding MA application. Will follow-up with them once we know MA is active. CD evaluator will follow-up with list of referrals. Pt continues to report preference for [facility].   - Received update from financial counselor that MA application has gone through and had a backdated start date of 12/1/22. Updated CD evaluator with this information.   - CD evaluator sent referrals for [facility], [facility], and [facility] residential CD programs.   - Completed ROIs with pt and shared that MA is active as of 12/1/22. Shared that renewal for MA will be in January.   - Spoke to Martina at [facility] and provided updates regarding pt. Martina will be doing a phone screen with pt and will follow-up with [facility] to discuss next steps. Currently, Martina reports [facility] has current openings and would be able to admit as soon as tomorrow.  - Martina from [facility] called back re: intake with pt. Martina noted that pt had some concerns regarding distance of the treatment facility and around having her vehicle. Martina reported that she is waiting to hear back from CTC and pt regarding placement but noted they have immediate openings.  - Spoke with pt following intake phone call with [facility]. Pt reported concerns regarding distance of the program as her family would have difficulty visiting her there. Pt also noted that she currently has a breathalyzer in her car and she is required to use it every other day or she gets fined $250. Pt continues to  request placement at [facility] as it is closer to her support system and they would be able to visit her there. CTC asked if pt would be open to [facility] if [facility] does not have any openings and pt said she is open to it.   - Left message for [facility] to check openings and status of referral.   - Martina called to check status. Shared with Martina that pt is waiting for information on another placement due to distance of [facility] from her support system. Martina understood this. Will follow-up with Martina once we have more information.    Discharge Plan or Goal:  Current plan is to stabilize symptoms and develop safe disposition plan. Following hospitalization, pt has expressed interest in substance use treatment - [facility] is pt first choice.      Barriers to Discharge:  Pt is awaiting placement at residential substance use treatment.     Referral Status:  Pt had CD evaluation this morning. Referrals submitted for [facility], [facility], and [facility]. Referral [facility]     Legal Status:  Pt is voluntary    Contacts:  Prepetition Screener ([facility])  Elaina Eubanks  Phone: [phone]  Email: [email or web address].     Upcoming Meetings and Dates/Important Information and next steps:  N/A    Team Note Due:  Thursday

## 2023-08-21 NOTE — PROGRESS NOTES
"[facility], [place]   Psychiatric Progress Note        Interim History:   The patient's care was discussed with the treatment team during the daily team meeting and/or staff's chart notes were reviewed.  Staff report patient had a good weekend. Slept for 7 hours last night. She was visited by one of her children and talked to other family members on the phone. Reported feeling better and more relaxed/positive about her life and perspectives. Confirmed that she was still interested in going to residential CD treatment program and today pm had CD consult, see recommendations below. For more details, please, see CD counselor's note. Appreciate CD team's input.    \"[facility] [place]  [facility] waitlist: [phone]  [facility] Admissions: [phone]     [facility] Team for Referrals  Phone: [phone]  Fax: [phone]     [facility] [address], [place] [address]  Email Address: [email or web address]   Main number: [phone]   Fax number: [phone]    Met with patient: she was seen in her room. [place] appeared to be in a good mood and joked at times appropriately to the situation. She confirmed that she was interested in CD program and that her preference would be to go to [facility] which she attended in 2012 and had a long period of sobriety afterwards. She complained of constipation, agreed to try Miralax. We discussed discontinuation of No roommate status and [place] said that she would be OK with that. She denied Suicidal ideation, Homicidal thoughts,Auditory hallucinations, Visual hallucinations and had no other questions or concerns.          Medications:      acyclovir   Topical Q3H    albuterol  2 puff Inhalation 4x Daily    buPROPion  150 mg Oral Daily    childrens multivitamin with iron  1 tablet Oral Daily    gabapentin  300 mg Oral At Bedtime    lithium  300 mg Oral BID    polyethylene glycol  " 17 g Oral Daily    QUEtiapine  200 mg Oral At Bedtime          Allergies:   No Known Allergies       Labs:   No results found for this or any previous visit (from the past 24 hour(s)).       Psychiatric Examination:     /70 (BP Location: Right arm)   Pulse 86   Temp 97.9  F (36.6  C) (Temporal)   Resp 18   Ht 1.524 m (5')   Wt 43.1 kg (95 lb)   LMP 09/15/2014   SpO2 99%   BMI 18.55 kg/m    Weight is 95 lbs 0 oz  Body mass index is 18.55 kg/m .    Orthostatic Vitals         Most Recent      Sitting Orthostatic BP 98/68 08/20 0900    Sitting Orthostatic Pulse (bpm) 98 08/20 0900    Standing Orthostatic /72 08/21 0942    Standing Orthostatic Pulse (bpm) 90 08/21 0942          Appearance: dressed in hospital scrubs and appeared as age stated  Attitude:  more cooperative  Eye Contact:  fair  Mood: less anxious, sad , and better  Affect:  full range  Speech:  clear, coherent and normal prosody  Psychomotor Behavior:  no evidence of tardive dyskinesia, dystonia, or tics  Throught Process:  linear and goal oriented  Associations:  no loose associations  Thought Content:  no evidence of suicidal ideation or homicidal ideation and no evidence of psychotic thought  Insight:  partial, improving  Judgement:  fair  Oriented to:  time, person, and place  Attention Span and Concentration:  fair  Recent and Remote Memory:  fair    Clinical Global Impressions  First: 5/3 8/18/2023      Most recent:            Precautions:     Behavioral Orders   Procedures    Code 1 - Restrict to Unit    Discontinue 1:1 attendant for suicide risk     Order Specific Question:   I have performed an in person assessment of the patient     Answer:   Based on this assessment the patient no longer requires a one on one attendant at this point in time.     Order Specific Question:   Rationale     Answer:   Patient States able to remain safe in hospital     Order Specific Question:   Rationale     Answer:   Modifications to care  environment made to mitigate safety risk     Order Specific Question:   Rationale     Answer:   Routine observations are sufficient to monitor safety.    Routine Programming     As clinically indicated    Status 15     Every 15 minutes.          DIagnoses:     Major depressive disorder, recurrent, severe ws Bipolar affective disorder, II depressed.  Stimulant use disorder (meth);   Moderate malnutrition.         Plan:     Will order Miralax prn, as per discussion above discontinue No roommate order. Will continue to provide support and structure. CTC will contact [facility] to inquire about beds availability.      Total time spent was 27 minutes. Over 50% of times was spent counseling and coordination of care regarding coping skills, medication and discharge planning.

## 2023-08-21 NOTE — CARE PLAN
08/21/23 1619   Group Therapy Session   Group Attendance attended group session   Time Session Began 1415   Time Session Ended 1500   Total Time (minutes) 45   Total # Attendees 2   Group Type psychoeducation;life skill;community   Group Topic Covered coping skills/lifestyle management;emotions/expression;relationship;structured socialization   Group Session Detail Topic Discussions: Group activity to encourage connection, communication, and self-reflection through discussion topic cards.   Patient Participation Detail Pt participated in a group activity promoting connection and self-reflection using discussion questions. Pt shared personal experiences and thoughtful insight on relationships, community dynamics, and difficulties with expectations and pressures from family. Pt appeared engaged in the activity and attentive to peers, listening actively and responding with sympathy to personal stories.

## 2023-08-21 NOTE — PLAN OF CARE
"  Problem: Depressive Signs/Symptoms  Goal: Improved Mood Symptoms (Depressive Signs/Symptoms)  Outcome: Progressing   Goal Outcome Evaluation:    Plan of Care Reviewed With: patient      [place] has been in and out of her room all shift. She has been very anxious this evening. Stated \"It is the Anniversary of her Daughter's Father's death.\" At 1630 she requested a some medication for anxiety. She had received Hydroxyzine just 2.5 hours prior. She then requested Zyprexa and was administered. She continues to have a worried, tense affect and has a obvious tremor when very anxious. She denies SI/SIB/AH/VH. She hopes to have her CD eval tomorrow. She has made some phone calls this evening. She denies SI/SIB/AH/VH. [place] had no visitors this evening. She did not participate in evening programing. Nursing to continue to support current plan of care.               "

## 2023-08-21 NOTE — CARE PLAN
"   08/21/23 1559   Group Therapy Session   Group Attendance attended group session   Time Session Began 1115   Time Session Ended 1200   Total Time (minutes) 20   Total # Attendees 2   Group Type community;recreation;life skill   Group Topic Covered cognitive activities;balanced lifestyle;leisure exploration/use of leisure time;structured socialization   Group Session Detail OT Leisure Group: Group activities to exercise cognitive skills while exploring leisure and socializing opportunities; \"Letter Pool\" - challenges players to name items in a category that begin with a randomly chosen letter.   Patient Participation Detail Pt participated in a group activity playing Letter Pool. Pt joined the group late and was open to learning and playing a new game. Pt appeared social and open to sharing personal experiences with the group. Pt reported wanting to retrieve refreshments and left group after 20 minutes (no charge).       "

## 2023-08-21 NOTE — PROGRESS NOTES
08/21/23 1138   Group Therapy Session   Group Attendance attended group session   Time Session Began 1015   Time Session Ended 1100   Total Time (minutes) 45   Total # Attendees 3   Group Type psychotherapeutic   Group Topic Covered disease of addiction/choices in recovery;emotions/expression;structured socialization;anger/conflict management   Group Session Detail Process and coloring- Anger, Grief, Mental health management and self com passion/ some discussion of Art Therapy directive the group would like to try on Wednesday to process grief and loss   Patient Response/Contribution cooperative with task;discussed personal experience with topic;expressed readiness to alter behaviors;listened actively;offered helpful suggestions to peers   Patient Participation Detail mood, stabilized/ anxious, some tears re grief and loss of anniversary death of father of child, death of her son  and recent boyfriend leaving, she showed insight     Writer will meet with her 1:1 this week as per her request.

## 2023-08-21 NOTE — PLAN OF CARE
Problem: Sleep Disturbance  Goal: Adequate Sleep/Rest  Outcome: Progressing     Goal Outcome Evaluation:    Patient asleep at start of shift. Breathing quiet and unlabored when sleeping. Patient had no c/o pain or discomfort during the HS. Patient declined scheduled acyclovir ointment. Appears to have slept 7 hours.

## 2023-08-21 NOTE — CONSULTS
5/21/2023      Pt has been interviewed via telephone and CD Consult has been completed. Email has been sent to unit  with MANJEET's for pt to sign. Pt has requested [facility] as her first choice. Referrals have been sent to treatment facilities for review, per pt request.    Recommendations:   Abstain from all non-prescribed mood-altering substances  Take all medications as prescribed  Enter and complete a CD residential or inpatient treatment program  Follow all recommendations upon discharge from treatment. Recommendations may include, but are not limited to: extended treatment, outpatient treatment and/or sober housing.  Increase sober support, attend support meetings at least one time weekly        6.   Follow all recommendations of your medical providers      Clinical Substantiation:       Pt reports she has been to CD treatment multiple times in the past. Pt reports she has attended sober support meetings. Pt reports she is not currently seeing a therapist and hasn't been taking MH med's. Pt reports she has family members supportive of sobriety. Pt reports she would like to attend inpatient CD treatment. Pt reports she is currently unemployed and homeless due to her ANNABELLA. 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      Referrals/ Alternatives:  [facility]  [facility] waitlist: [phone]  [facility] Admissions: [phone]     [facility] Team for Referrals  Phone: [phone]  Fax: [phone]     [facility]  [address], [place] [address]  Email Address: [email or web address]   Main number: [phone]   Fax number: [phone]        ANNABELLA consult completed by: NILO Lutz.  Phone Number: [phone]  E-mail Address: [email or web address]  [facility] Mental Health and Addiction Services Evaluation Department  [address] [place] [address]     *Due to  regulation of Title 42 of the Code of Federal Regulations (CFR) Part 2: Confidentiality laws apply to this note and the information wherein.  Thus, this note cannot be copy and pasted into any other health care staff's note nor can it be included in general medical records sent to ANY outside agency without the patient's written consent.

## 2023-08-21 NOTE — CARE PLAN
08/21/23 1831   Group Therapy Session   Group Attendance attended group session   Time Session Began 1700   Time Session Ended 1800   Total Time (minutes) 60   Total # Attendees 6   Group Type psychotherapeutic   Group Topic Covered coping skills/lifestyle management;emotions/expression   Group Session Detail CTC-Group members completed/discussed worksheets on positive mindset and silver linings.   Patient Response/Contribution cooperative with task;discussed personal experience with topic;listened actively;offered helpful suggestions to peers   Patient Participation Detail Pt presented to group was pleasant, engaged, and pt checked in feeling anxious in a good way.
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
## 2023-08-21 NOTE — CARE PLAN
08/21/23 1550   Group Therapy Session   Group Attendance attended group session   Time Session Began 1115   Time Session Ended 1200   Total Time (minutes) 20   Total # Attendees 4   Group Type task skill;recreation;life skill   Group Topic Covered balanced lifestyle;coping skills/lifestyle management;leisure exploration/use of leisure time;problem-solving;self-care activities;relaxation techniques   Group Session Detail OT Clinic: open group activities to promote self-expression, independence, and leisure exploration while working to demonstrate cognitive skills   Patient Participation Detail Pt joined open OT group late, and worked on [place]. Pt appeared upbeat and social, chatting with peers about their cultural communities. Pt requested a song when writer asked the group for music recommendations. Pt stayed and participated until the end of the group.

## 2023-08-21 NOTE — TELEPHONE ENCOUNTER
8/21/2023    LP SCREEN TELEPHONE NOTE   Madison bArams paperwork was reviewed by NILO Lutz and the patient was deemed ELIGIBLE for the LP program.     Inpatient or Community Referral: Inpatient referral     Medical Screening (As Needed): The patient's medical and COVID-19 screen with the LP RN is pending at this time and NEEDS to be completed prior to placing this patient on the LP waiting list.     Regular use of benzodiazapine's (other than detox): The patient does NOT use benzodiazepines.     Insurance: [facility] is responsible for obtaining ALL authorizations for treatment services at the EOP, DOP and LP levels of care.      This will be a: Seen by a [facility] Evaluation Counselor: INOCENTE, ISP & LP UPDATE NOTE evaluation. (Update SBAR and route DAANES and MANJEET's)     IV use or Pregnant: This patient is not pregnant or an IV drug user.     Business office: [phone]     List: This patient CAN NOT be placed on the [facility]IOR[facility] LP Waiting List until after being medically approved for the LP program by the LP RN.       Group: [facility]     Additional Info as needed: Pt has met with   and her MA is supposed to be activated today. Please follow up with CHAI Dumont [phone]     The best current contact telephone number for the patient is: station [facility] [phone]       Thanks,  NILO Lutz   8/21/2023

## 2023-08-22 PROCEDURE — 250N000013 HC RX MED GY IP 250 OP 250 PS 637: Performed by: EMERGENCY MEDICINE

## 2023-08-22 PROCEDURE — 250N000013 HC RX MED GY IP 250 OP 250 PS 637: Performed by: PSYCHIATRY & NEUROLOGY

## 2023-08-22 PROCEDURE — 250N000013 HC RX MED GY IP 250 OP 250 PS 637: Performed by: FAMILY MEDICINE

## 2023-08-22 PROCEDURE — 99232 SBSQ HOSP IP/OBS MODERATE 35: CPT | Performed by: PSYCHIATRY & NEUROLOGY

## 2023-08-22 PROCEDURE — G0177 OPPS/PHP; TRAIN & EDUC SERV: HCPCS

## 2023-08-22 PROCEDURE — 90853 GROUP PSYCHOTHERAPY: CPT

## 2023-08-22 PROCEDURE — 250N000013 HC RX MED GY IP 250 OP 250 PS 637

## 2023-08-22 PROCEDURE — 124N000002 HC R&B MH UMMC

## 2023-08-22 RX ORDER — GABAPENTIN 300 MG/1
300 CAPSULE ORAL AT BEDTIME
Qty: 30 CAPSULE | Refills: 2 | Status: SHIPPED | OUTPATIENT
Start: 2023-08-22

## 2023-08-22 RX ORDER — POLYETHYLENE GLYCOL 3350 17 G/17G
17 POWDER, FOR SOLUTION ORAL DAILY PRN
Qty: 30 PACKET | Refills: 0 | Status: SHIPPED | OUTPATIENT
Start: 2023-08-22

## 2023-08-22 RX ORDER — TRAZODONE HYDROCHLORIDE 50 MG/1
50 TABLET, FILM COATED ORAL
Qty: 30 TABLET | Refills: 0 | Status: SHIPPED | OUTPATIENT
Start: 2023-08-22

## 2023-08-22 RX ORDER — POLYETHYLENE GLYCOL 3350 17 G/17G
17 POWDER, FOR SOLUTION ORAL DAILY PRN
Status: DISCONTINUED | OUTPATIENT
Start: 2023-08-22 | End: 2023-08-23 | Stop reason: HOSPADM

## 2023-08-22 RX ORDER — PRAMIPEXOLE DIHYDROCHLORIDE 0.25 MG/1
0.25 TABLET ORAL AT BEDTIME
Qty: 30 TABLET | Refills: 0 | Status: SHIPPED | OUTPATIENT
Start: 2023-08-22

## 2023-08-22 RX ORDER — ALBUTEROL SULFATE 90 UG/1
2 AEROSOL, METERED RESPIRATORY (INHALATION) EVERY 6 HOURS PRN
Qty: 18 G | Refills: 0 | Status: SHIPPED | OUTPATIENT
Start: 2023-08-22

## 2023-08-22 RX ORDER — HYDROXYZINE HYDROCHLORIDE 25 MG/1
25-50 TABLET, FILM COATED ORAL EVERY 4 HOURS PRN
Qty: 120 TABLET | Refills: 2 | Status: SHIPPED | OUTPATIENT
Start: 2023-08-22

## 2023-08-22 RX ORDER — LITHIUM CARBONATE 300 MG/1
300 CAPSULE ORAL 2 TIMES DAILY
Qty: 60 CAPSULE | Refills: 0 | Status: SHIPPED | OUTPATIENT
Start: 2023-08-22

## 2023-08-22 RX ORDER — QUETIAPINE 200 MG/1
200 TABLET, FILM COATED, EXTENDED RELEASE ORAL AT BEDTIME
Qty: 30 TABLET | Refills: 0 | Status: SHIPPED | OUTPATIENT
Start: 2023-08-22

## 2023-08-22 RX ORDER — ACYCLOVIR 50 MG/G
OINTMENT TOPICAL
Qty: 30 G | Refills: 0 | Status: SHIPPED | OUTPATIENT
Start: 2023-08-22

## 2023-08-22 RX ORDER — BUPROPION HYDROCHLORIDE 150 MG/1
150 TABLET, EXTENDED RELEASE ORAL DAILY
Qty: 30 TABLET | Refills: 0 | Status: SHIPPED | OUTPATIENT
Start: 2023-08-23

## 2023-08-22 RX ORDER — PRAMIPEXOLE DIHYDROCHLORIDE 0.25 MG/1
0.25 TABLET ORAL AT BEDTIME
Status: DISCONTINUED | OUTPATIENT
Start: 2023-08-22 | End: 2023-08-23 | Stop reason: HOSPADM

## 2023-08-22 RX ADMIN — BUPROPION HYDROCHLORIDE 150 MG: 150 TABLET, FILM COATED, EXTENDED RELEASE ORAL at 08:50

## 2023-08-22 RX ADMIN — Medication 1 TABLET: at 08:50

## 2023-08-22 RX ADMIN — PRAMIPEXOLE DIHYDROCHLORIDE 0.25 MG: 0.25 TABLET ORAL at 22:09

## 2023-08-22 RX ADMIN — QUETIAPINE FUMARATE 200 MG: 200 TABLET, EXTENDED RELEASE ORAL at 21:04

## 2023-08-22 RX ADMIN — HYDROXYZINE HYDROCHLORIDE 50 MG: 25 TABLET, FILM COATED ORAL at 10:39

## 2023-08-22 RX ADMIN — POLYETHYLENE GLYCOL 3350 17 G: 17 POWDER, FOR SOLUTION ORAL at 08:50

## 2023-08-22 RX ADMIN — GABAPENTIN 300 MG: 300 CAPSULE ORAL at 21:04

## 2023-08-22 RX ADMIN — OLANZAPINE 10 MG: 10 TABLET, FILM COATED ORAL at 15:45

## 2023-08-22 RX ADMIN — LITHIUM CARBONATE 300 MG: 300 CAPSULE, GELATIN COATED ORAL at 21:04

## 2023-08-22 RX ADMIN — LITHIUM CARBONATE 300 MG: 300 CAPSULE, GELATIN COATED ORAL at 08:50

## 2023-08-22 RX ADMIN — HYDROXYZINE HYDROCHLORIDE 50 MG: 25 TABLET, FILM COATED ORAL at 18:25

## 2023-08-22 RX ADMIN — NICOTINE POLACRILEX 4 MG: 4 GUM, CHEWING BUCCAL at 14:28

## 2023-08-22 RX ADMIN — GLYCERIN 1 SUPPOSITORY: 2 SUPPOSITORY RECTAL at 20:50

## 2023-08-22 RX ADMIN — OLANZAPINE 10 MG: 10 TABLET, FILM COATED ORAL at 22:18

## 2023-08-22 ASSESSMENT — ACTIVITIES OF DAILY LIVING (ADL)
ADLS_ACUITY_SCORE: 34
LAUNDRY: WITH SUPERVISION
ADLS_ACUITY_SCORE: 34
ORAL_HYGIENE: INDEPENDENT
ADLS_ACUITY_SCORE: 34
HYGIENE/GROOMING: INDEPENDENT
HYGIENE/GROOMING: SHOWER;INDEPENDENT;HANDWASHING
DRESS: STREET CLOTHES
ADLS_ACUITY_SCORE: 34
LAUNDRY: WITH SUPERVISION
DRESS: STREET CLOTHES
ADLS_ACUITY_SCORE: 34
ADLS_ACUITY_SCORE: 34
ORAL_HYGIENE: INDEPENDENT

## 2023-08-22 NOTE — CARE PLAN
08/22/23 1445   Group Therapy Session   Group Attendance attended group session   Time Session Began 1015   Time Session Ended 1115   Total Time (minutes) 45   Total # Attendees 5   Group Type Occupational Therapy   Group Topic Covered balanced lifestyle;coping skills/lifestyle management;leisure exploration/use of leisure time;relaxation techniques   Group Session Detail OT Clinic Group   Patient Participation Detail Intervention: OT Clinic with 4 peers. Pt participated in a OT Clinic group to facilitate coping skills exploration and creative expression through personally meaningful activities, and to encourage utilization of these healthy coping skills to promote overall health and wellness. Group included clinical observation of social, cognitive and kinesthetic performance skills to inform treatment and safe discharge planning.    Patient Response: Joined clinic group requesting supplies to begin working on a new sk[employer]or project, ended up creating their family crest as the project. Was social off and on with peers and writer during this time. Appropriately requested additional materials when needed. Left group twice briefly, returning each time (needing glasses, taking a break from room d/t temperature).     Mood/Affect: Pleasant       Plan: Patient encouraged to maintain attendance for continued ongoing support in working towards occupational therapy goals to support overall treatment/care.

## 2023-08-22 NOTE — PLAN OF CARE
"  Problem: Adult Behavioral Health Plan of Care  Goal: Adheres to Safety Considerations for Self and Others  Outcome: Progressing  Intervention: Develop and Maintain Individualized Safety Plan  Recent Flowsheet Documentation  Taken 8/21/2023 1823 by Prudence Chamberlain RN  Safety Measures:   clinical history reviewed   safety rounds completed   suicide check-in completed   Goal Outcome Evaluation:    Plan of Care Reviewed With: patient        Patient was intermittently out in the lounge, attended groups this shift, otherwise was isolative to the room. Patient presents with a flat affect but bright upon approach, mood appears calm. Patient endorsed frustration due to not getting a CD placement yet, reports \"high\" anxiety due to being in the hospital, \"a little bit\" of depression, denied SI, HI, SIB, AVH. Patient reports lower-mid back pain, reports \"I believe the pain is due to build-up, I am constipated\" PRN Senna and Miralax were given. Patient is yet to have a BM. Patient is eating and drinking adequately. Medication compliant. Patient requested and received PRN Olanzapine 10mg for agitation. Patient has notable tremors in the bilateral upper extremities. VSS. No behavioral outbursts, or acute safety concerns. Staff will continue to follow the plan of care.          "

## 2023-08-22 NOTE — TELEPHONE ENCOUNTER
"  August 22, 2023    Referral Medical Screening:  Per client self report and chart review     1) Medications?  Lithium wellbutrin zyprexa seroquel requip gabapentin hydroxyzine trazodone  For hospital or any facility \"door to door\"...Nurse to nurse report required and \"too soon to refill\" medication issues must be resolved prior to pt admission.  We have no rounding provider at [facility] to asess medical issues or trouble shoot medication issues.      Pt works with provider at [facility] for her psych meds    2) Medical conditions?  Past medical history: COPD, endometriosis, lumbar spinal stenosis with chronic low back pain, status post lumbar spinal fusion.        Diagnosis Date    ADD (attention deficit disorder with hyperactivity)      Anxiety      Bipolar affective disorder (H)      Chemical dependency (H)      COPD with emphysema (H)      Depressive disorder      H/O recurrent vertebral fractures       3x all assoicated with significant mechanism of injury    Spontaneous pneumothorax       Respiratory Condition? (Asthma, COPD, RAD, Bronchitis, Emphysema, Cystic fibrosis, RAMYA, etc.)  None  If yes what kind? Has inhaler listed on med list unsure what it is being used for  What medications or equipment do you use? (Nebulizer, inhalers, CPAP, BiPap...etc) rescue inhaler     Writer informed patient they need to bring all respiratory equipment / medications in order to [facility] to [facility]    3) Independent with ADL'S?  Yes     4) Assistive devices (ambulatory, orthotics, hearing, c-pap etc.)?  None    5) Fall risk?  None    6) Pain management concerns?  None    7) Dental health concerns?  None    8) Skin integrity concerns (wounds, rash, etc)?  None    9) Infectious disease concerns (MRSA, ESBL, VRE, C-Diff, TB, etc.)  None    10) Mental health concerns /suicidal ideation?  See psych notes from current admit, pt symptoms have improved     11) COVID-19 Symptom Screening Tool:     Do you have any of the " following NEW or worsening symptoms NOT attributed to pre-existing conditions?    No,     Fever of 100.0  F (37.8 C) or over  Chills  Cough  Shortness of Breath  Loss of taste or smell  Generalized body aches  Persistent headache  Sore throat (or trouble eating or drinking in young children?)  Nausea, Vomiting, or diarrhea (loose stools)    Did you test positive for COVID-19 in the last 14  days or are you waiting on the test results due to an exposure or symptoms?  No,     Has anyone told you to self-quarantine due to exposure to someone with COVID-19?  No,      Positive screen - LPRN to reach out to [facility] Infection Prevention for advisement/recommendations     Based on above assessment: Client meets medical criteria for admission to [facility]    IS THIS PT CONSIDERED COMPLEX? No,       IF patient is APPROVED for Admission to , they are informed of the following (below) by LPRN:    1) No drug or alcohol use for a minimum of 24 hours prior to admission to .  2) Should you acquire COVID or influenza symptoms, you may not admit to [facility].  Call LPRN PRIOR TO ADMISSION for assessment / recommendations at [phone].  3)Structured outdoor activities may involve walking several city blocks. If you think you may have difficulty safely navigating this distance please speak with the [facility] Nurse.    4) Bring 30 day supply of all medications.  All OTC's must be sealed for use at   5) All medication issues must be resolved prior to admission.  We have no rounding provider at [facility] to assess medical issues or trouble shoot medication issues.    [facility]  Nurse Liaison / CD Adult [facility]  O: [phone]  Fax:[phone]  LPRN [place] [id number]  M-F: 7AM to 5:30PM   Sat-Sun: 7AM to 3PM  After hours: [phone]

## 2023-08-22 NOTE — PLAN OF CARE
.        Assessment/Intervention/Current Symtoms and Care Coordination:  Chart review and met with team, discussed pt progress, symptomology, and response to treatment.  Discussed the discharge plan and any potential impediments to discharge..     Barriers to Discharge:  Pt is awaiting placement at residential substance use treatment.  Pt has specific desires around which placement  Pt did not have active health insurnace until 8/21/23.       Referral Status:  Pt had CD evaluation 8/21/23.   Referrals submitted for [facility], [facility], and [facility].     [facility] did phone screen and they have openings but the pt declined to go there due to the distance,    [facility]- Pt prefers this program. Call placed to Diana @ [phone] who spoke with Admissions Coordinator. @ 10:30AM, they are still reviewing this pt's chart to determine appropriateness of admission.  They will admit her tomorrow at 10:30AM with 30 days of medications.  RN to call at 9:30 for case update to  RN to phone:[phone]  I informed the pt but she had already received a call from . She mentiosned she had a lot of belongings.      Referral for [facility] Case Management will be placed by [facility] screener.       I asked CCs to schedule psychiatry appointment with pt's exisiting psychiatrist. Pt informed me it was Dr. Sanders at [facility].      Legal Status:  Pt is voluntary     Contacts:  Prepetition Screener ([facility])  Elaina Eubanks  Phone: [phone]  Email: [email or web address].      [facility]: [phone]        [place] [address]  Phone: [phone]  The Health Unit Coordinator has faxed these discharge instructions to fax:[phone].         Upcoming Meetings and Dates/Important Information and next steps:  N/A     Team Note Due:  Thursday

## 2023-08-22 NOTE — PLAN OF CARE
Problem: Sleep Disturbance  Goal: Adequate Sleep/Rest  Outcome: Progressing       Patient appeared to be sleeping through out the night. Respirations even and unlabored. No safety concerns noted or reported.

## 2023-08-22 NOTE — PLAN OF CARE
"Goal Outcome Evaluation:    Plan of Care Reviewed With: patient    Problem: Adult Behavioral Health Plan of Care  Goal: Adheres to Safety Considerations for Self and Others  Outcome: Progressing  Flowsheets (Taken 8/22/2023 1515)  Adheres to Safety Considerations for Self and Others: making progress toward outcome       Pt has been visible in the milieu attending groups and socializing with selective peers. Pt presents with a blunted flat affect.  Appeared tense and restless on approach. Reported endorsing\"high anxiety\" she rated at a 8/10.Pt requested and received PRN hydroxyzine 50 mg for anxiety. Writer later checked in with pt who reported medication was effective. Pt later approached writer and c/o heaviness in both legs. On assessment , pt denies pain, no redness or swelling noted in both legs. Pt reports taking medication in the past for restless legs. Pt was encouraged to elevate both legs and was receptive. Pt requested and received PRN Zyprexa for agitation and anxiety. Denies SI/SIB/HI/AVH. Will continue to assess and follow POC.    "

## 2023-08-22 NOTE — TELEPHONE ENCOUNTER
Writer reached out to [facility] to schedule pt for 8/23/23 at 10:30 am. Pt is to come with 30 days of medications and have a RN to RN review one hour before they admit.

## 2023-08-22 NOTE — PLAN OF CARE
Care Coordinator scheduled the following appointment:     Psychiatry appointment: Tuesday, September 19, 2023 @ 9:45am  Provider: Sandor Sanders MD  Location: [facility], Ground Floor  [address] [place] [address]  **The building and floor location has changed since your last appointment.   Phone: [phone]  Fax: [phone]  [facility] TO FAX AVS to above appointment, please    CC updated CTC and AVS    Mavis Lou  Care Coordinator  [email or web address]  [phone]

## 2023-08-22 NOTE — PROGRESS NOTES
[facility], [place]   Psychiatric Progress Note        Interim History:   The patient's care was discussed with the treatment team during the daily team meeting and/or staff's chart notes were reviewed.  Staff report patient slept through the night and was overall, cooperative with meds. She reported small BM after taking Miralax. Was reported to be mildly irritable, not agitated.     Met with patient: she was seen in her room. Madison appeared to be in a good mood. Denied presence of Suicidal ideation, Homicidal thoughts, psychotic symptoms. Had full range affect. She complained of restless legs, asked to put her on Mirapex which I promised to do and complained of Constipation. Agreed with recommendation to try suppository. Openly admitted that she felt bored and upset about being here and would like to leave ASAP to go to CD program. I assured her that we continue to work on her discharge and expect her discharge to CD program soon. She was thankful and had no more questions or concerns. Shortly after visit with Madison I was informed by [facility] that [facility] could take patient tomorrow. This info was passed to Madison.          Medications:      acyclovir   Topical Q3H    albuterol  2 puff Inhalation 4x Daily    buPROPion  150 mg Oral Daily    childrens multivitamin with iron  1 tablet Oral Daily    gabapentin  300 mg Oral At Bedtime    lithium  300 mg Oral BID    pramipexole  0.25 mg Oral At Bedtime    QUEtiapine  200 mg Oral At Bedtime          Allergies:   No Known Allergies       Labs:   No results found for this or any previous visit (from the past 24 hour(s)).       Psychiatric Examination:     /77 (BP Location: Left arm, Patient Position: Sitting, Cuff Size: Adult Small)   Pulse 89   Temp 97  F (36.1  C) (Oral)   Resp 18   Ht 1.524 m (5')   Wt 44.8 kg (98 lb 11.2 oz)   LMP 09/15/2014   SpO2 99%   BMI 19.28 kg/m    Weight is 98 lbs 11.2 oz  Body mass index is 19.28  kg/m .    Orthostatic Vitals         Most Recent      Sitting Orthostatic /77 08/22 0848    Sitting Orthostatic Pulse (bpm) 89 08/22 0848    Standing Orthostatic /69 08/22 0848    Standing Orthostatic Pulse (bpm) 92 08/22 0848          Appearance: dressed in hospital scrubs and appeared as age stated  Attitude:  more cooperative  Eye Contact:  fair  Mood: less better  Affect:  full range  Speech:  clear, coherent and normal prosody  Psychomotor Behavior:  no evidence of tardive dyskinesia, dystonia, or tics  Throught Process:  linear and goal oriented  Associations:  no loose associations  Thought Content:  no evidence of suicidal ideation or homicidal ideation and no evidence of psychotic thought  Insight:  partial, improving  Judgement:  fair  Oriented to:  time, person, and place  Attention Span and Concentration:  fair  Recent and Remote Memory:  fair    Clinical Global Impressions  First: 5/3 8/18/2023      Most recent:            Precautions:     Behavioral Orders   Procedures    Code 1 - Restrict to Unit    Discontinue 1:1 attendant for suicide risk     Order Specific Question:   I have performed an in person assessment of the patient     Answer:   Based on this assessment the patient no longer requires a one on one attendant at this point in time.     Order Specific Question:   Rationale     Answer:   Patient States able to remain safe in hospital     Order Specific Question:   Rationale     Answer:   Modifications to care environment made to mitigate safety risk     Order Specific Question:   Rationale     Answer:   Routine observations are sufficient to monitor safety.    Routine Programming     As clinically indicated    Status 15     Every 15 minutes.          DIagnoses:     Major depressive disorder, recurrent, severe ws Bipolar affective disorder, II depressed.  Stimulant use disorder (meth);   Moderate malnutrition.         Plan:     Will change Miralax to prn, order Mirapex and  suppository prn. Will leave for [facility] tomorrow. Meds were reconciled and sent electronically to discharge pharmacy.     Total time spent was 36 minutes. Over 50% of times was spent counseling and coordination of care regarding coping skills, medication and discharge planning.

## 2023-08-22 NOTE — TELEPHONE ENCOUNTER
----- Message from Diana Rawls sent at 8/22/2023  1:24 PM CDT -----  Regarding: NEW DENISE NEEDED  This patient will transfer from [facility] tomorrow 8/23/23 at 10:30am

## 2023-08-22 NOTE — CARE PLAN
"   08/22/23 1552   Group Therapy Session   Group Attendance attended group session   Time Session Began 1300   Time Session Ended 1400   Total Time (minutes) 45   Total # Attendees 6   Group Type Occupational Therapy   Group Topic Covered balanced lifestyle;coping skills/lifestyle management;emotions/expression;leisure exploration/use of leisure time;relaxation techniques;self-care activities;structured socialization   Group Session Detail General Health and Coping   Patient Participation Detail   Intervention: General Health and Coping Group with 5 peers.    Patient Response: Pt participated in group focused on the use of positive affirmations as a healthy coping skill. Group involved discussion of positive affirmations and creating a personal affirmations project using personally selected meaningful affirmations. Was an active participant for duration of time in group. Chose \"my body is a miracle\" as their meaningful affirmation. Expressed feeling positive about discharge tomorrow. Was social with writer and peers during this time.     Mood/Affect: Pleasant      Plan: Patient encouraged to maintain attendance for continued ongoing support in working towards occupational therapy goals to support overall treatment/care.        "

## 2023-08-22 NOTE — TELEPHONE ENCOUNTER
Date: 8/22/2023    To: ALMITA RN    Please call this patient at [facility] [phone] to complete a medical screening to clarify her medications and medical condition and to complete a COVID-19 screening.      The patient has a history of:  Currently hospitalized on [facility].    INSIDE: This patient had a substance abuse assessment with NILO Lutz, so no paperwork will be sent up to the 6th floor because all of the clinical documentation is in the patient's electronic medical record in [facility].      Thanks,  NILO Cerda

## 2023-08-23 ENCOUNTER — TRANSFERRED RECORDS (OUTPATIENT)
Dept: HEALTH INFORMATION MANAGEMENT | Facility: CLINIC | Age: 53
End: 2023-08-23
Payer: MEDICAID

## 2023-08-23 ENCOUNTER — TELEPHONE (OUTPATIENT)
Dept: BEHAVIORAL HEALTH | Facility: CLINIC | Age: 53
End: 2023-08-23
Payer: MEDICAID

## 2023-08-23 ENCOUNTER — HOSPITAL ENCOUNTER (OUTPATIENT)
Dept: BEHAVIORAL HEALTH | Facility: CLINIC | Age: 53
Discharge: HOME | End: 2023-08-23
Attending: FAMILY MEDICINE
Payer: MEDICAID

## 2023-08-23 VITALS
HEIGHT: 60 IN | RESPIRATION RATE: 18 BRPM | HEART RATE: 94 BPM | BODY MASS INDEX: 19.38 KG/M2 | TEMPERATURE: 97 F | OXYGEN SATURATION: 98 % | WEIGHT: 98.7 LBS | DIASTOLIC BLOOD PRESSURE: 64 MMHG | SYSTOLIC BLOOD PRESSURE: 95 MMHG

## 2023-08-23 DIAGNOSIS — F17.219 CIGARETTE NICOTINE DEPENDENCE WITH NICOTINE-INDUCED DISORDER: Primary | ICD-10-CM

## 2023-08-23 DIAGNOSIS — Z11.3 SCREEN FOR STD (SEXUALLY TRANSMITTED DISEASE): Primary | ICD-10-CM

## 2023-08-23 PROBLEM — F17.200 NICOTINE ADDICTION: Status: ACTIVE | Noted: 2023-08-23

## 2023-08-23 PROBLEM — F19.20 CHEMICAL DEPENDENCY (H): Status: ACTIVE | Noted: 2023-08-23

## 2023-08-23 PROCEDURE — H2035 A/D TX PROGRAM, PER HOUR: HCPCS

## 2023-08-23 PROCEDURE — 90853 GROUP PSYCHOTHERAPY: CPT

## 2023-08-23 PROCEDURE — G0177 OPPS/PHP; TRAIN & EDUC SERV: HCPCS

## 2023-08-23 PROCEDURE — 250N000013 HC RX MED GY IP 250 OP 250 PS 637

## 2023-08-23 PROCEDURE — 250N000013 HC RX MED GY IP 250 OP 250 PS 637: Performed by: EMERGENCY MEDICINE

## 2023-08-23 PROCEDURE — 250N000013 HC RX MED GY IP 250 OP 250 PS 637: Performed by: PSYCHIATRY & NEUROLOGY

## 2023-08-23 PROCEDURE — 250N000013 HC RX MED GY IP 250 OP 250 PS 637: Performed by: FAMILY MEDICINE

## 2023-08-23 PROCEDURE — 99239 HOSP IP/OBS DSCHRG MGMT >30: CPT | Performed by: PSYCHIATRY & NEUROLOGY

## 2023-08-23 RX ORDER — GUAIFENESIN 100 MG/5ML
SOLUTION ORAL EVERY 4 HOURS PRN
COMMUNITY

## 2023-08-23 RX ORDER — AMOXICILLIN 250 MG
2 CAPSULE ORAL DAILY PRN
COMMUNITY

## 2023-08-23 RX ORDER — MAGNESIUM HYDROXIDE/ALUMINUM HYDROXICE/SIMETHICONE 120; 1200; 1200 MG/30ML; MG/30ML; MG/30ML
30 SUSPENSION ORAL EVERY 6 HOURS PRN
COMMUNITY

## 2023-08-23 RX ORDER — LANOLIN ALCOHOL/MO/W.PET/CERES
3 CREAM (GRAM) TOPICAL
COMMUNITY

## 2023-08-23 RX ORDER — ACETAMINOPHEN 325 MG/1
325-650 TABLET ORAL EVERY 4 HOURS PRN
COMMUNITY

## 2023-08-23 RX ORDER — IBUPROFEN 200 MG
400 TABLET ORAL EVERY 6 HOURS PRN
COMMUNITY

## 2023-08-23 RX ORDER — LORATADINE 10 MG/1
10 TABLET ORAL DAILY PRN
COMMUNITY

## 2023-08-23 RX ADMIN — BUPROPION HYDROCHLORIDE 150 MG: 150 TABLET, FILM COATED, EXTENDED RELEASE ORAL at 08:45

## 2023-08-23 RX ADMIN — Medication 1 TABLET: at 08:45

## 2023-08-23 RX ADMIN — HYDROXYZINE HYDROCHLORIDE 50 MG: 25 TABLET, FILM COATED ORAL at 10:31

## 2023-08-23 RX ADMIN — NICOTINE POLACRILEX 4 MG: 4 GUM, CHEWING BUCCAL at 12:46

## 2023-08-23 RX ADMIN — LITHIUM CARBONATE 300 MG: 300 CAPSULE, GELATIN COATED ORAL at 08:45

## 2023-08-23 ASSESSMENT — ACTIVITIES OF DAILY LIVING (ADL)
ADLS_ACUITY_SCORE: 34
ADLS_ACUITY_SCORE: 34
DRESS: STREET CLOTHES
ADLS_ACUITY_SCORE: 34
ORAL_HYGIENE: INDEPENDENT
ADLS_ACUITY_SCORE: 34
ADLS_ACUITY_SCORE: 34
LAUNDRY: WITH SUPERVISION
HYGIENE/GROOMING: INDEPENDENT
ADLS_ACUITY_SCORE: 34
ADLS_ACUITY_SCORE: 34

## 2023-08-23 ASSESSMENT — ANXIETY QUESTIONNAIRES
3. WORRYING TOO MUCH ABOUT DIFFERENT THINGS: MORE THAN HALF THE DAYS
2. NOT BEING ABLE TO STOP OR CONTROL WORRYING: MORE THAN HALF THE DAYS
4. TROUBLE RELAXING: NEARLY EVERY DAY
5. BEING SO RESTLESS THAT IT IS HARD TO SIT STILL: NEARLY EVERY DAY
1. FEELING NERVOUS, ANXIOUS, OR ON EDGE: NEARLY EVERY DAY
GAD7 TOTAL SCORE: 19
6. BECOMING EASILY ANNOYED OR IRRITABLE: NEARLY EVERY DAY
IF YOU CHECKED OFF ANY PROBLEMS ON THIS QUESTIONNAIRE, HOW DIFFICULT HAVE THESE PROBLEMS MADE IT FOR YOU TO DO YOUR WORK, TAKE CARE OF THINGS AT HOME, OR GET ALONG WITH OTHER PEOPLE: SOMEWHAT DIFFICULT
GAD7 TOTAL SCORE: 19
7. FEELING AFRAID AS IF SOMETHING AWFUL MIGHT HAPPEN: NEARLY EVERY DAY

## 2023-08-23 ASSESSMENT — COLUMBIA-SUICIDE SEVERITY RATING SCALE - C-SSRS
3. HAVE YOU BEEN THINKING ABOUT HOW YOU MIGHT KILL YOURSELF?: YES
1. IN THE PAST MONTH, HAVE YOU WISHED YOU WERE DEAD OR WISHED YOU COULD GO TO SLEEP AND NOT WAKE UP?: YES
6. HAVE YOU EVER DONE ANYTHING, STARTED TO DO ANYTHING, OR PREPARED TO DO ANYTHING TO END YOUR LIFE?: YES
1. IN THE PAST MONTH, HAVE YOU WISHED YOU WERE DEAD OR WISHED YOU COULD GO TO SLEEP AND NOT WAKE UP?: YES
4. HAVE YOU HAD THESE THOUGHTS AND HAD SOME INTENTION OF ACTING ON THEM?: NO
2. HAVE YOU ACTUALLY HAD ANY THOUGHTS OF KILLING YOURSELF LIFETIME?: YES
5. HAVE YOU STARTED TO WORK OUT OR WORKED OUT THE DETAILS OF HOW TO KILL YOURSELF? DO YOU INTEND TO CARRY OUT THIS PLAN?: YES
2. HAVE YOU ACTUALLY HAD ANY THOUGHTS OF KILLING YOURSELF IN THE PAST MONTH?: YES

## 2023-08-23 ASSESSMENT — PATIENT HEALTH QUESTIONNAIRE - PHQ9: SUM OF ALL RESPONSES TO PHQ QUESTIONS 1-9: 14

## 2023-08-23 NOTE — PROGRESS NOTES
Name: Madison Abrams  Date: 8/23/2023  Medical Record: [id number]    Envelope Number: [id number]    List of Contents (List each item separately in new row):   One cell phone &   Admission:  I am responsible for any personal items that are not sent to the safe or pharmacy.  [facility] is not responsible for loss, theft or damage of any property in my possession.    Patient Signature:  ___________________________________________       Date/Time:__________________________    Staff Signature: __________________________________       Date/Time:__________________________    [facility] Staff person, if patient is unable/unwilling to sign:      __________________________________________________________       Date/Time: __________________________    **All medications are packaged by [facility] staff and securely stored on [facility]. Medications left by patients at discharge will be packaged by [facility] staff and transported by [facility] staff to inpatient pharmacy for storage.**    Discharge:  [facility] has returned all of my personal belongings:    Patient Signature: ________________________________________     Date/Time: ____________________________________    Staff Signature: ______________________________________     Date/Time:_____________________________________
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
## 2023-08-23 NOTE — PROGRESS NOTES
Initial Services Plan    Before your first treatment group, please do the following    Immediate health & safety concerns: Look for sober housing and a supportive social network.  Look for a support network (such as AA, NA, DBT group, a Amish group, etc.)    Suggestions for client during the time between intake & completion of treatment plan:  Tour your treatment center (unit or outpatient clinic).  Introduce yourself to the treatment group.  Spend time getting to know your peers.  Complete the problem list for your treatment plan.  Start drug and alcohol use history.  Review your patient or client handbook.    Client issues to be addressed in the first treatment sessions:  Identify motivations(s) for coming to treatment, i.e. legal, family, job, self  Identify concerns about coming into treatment, i.e. fear of failing again, sharing a room in treatment  Identify outside concerns that may interfere with treatment, i.e. bills not getting paid, homesick for children    Nash Pierre, [facility]  8/23/2023

## 2023-08-23 NOTE — PLAN OF CARE
Goal Outcome Evaluation:    Plan of Care Reviewed With: patient      Pt reports endorsing anxiety at 7/10, requested and received PRN hydroxyzine 50 mg for anxiety.  She was visible in the lounge watching movies with peers. Intermittently drawing and resting in room. Mood appeared  calm. She presents with a blunted  flat affect, but brighten during interactions. Pt requested and received  PRN suppository for constipation . Pt has notable tremors in the bilateral upper extremities.Endorses some depression, denied SI/SIB/HI/AVH. Pt did some laundry in preparation for [facility]. Pt is [facility]. Pt reported PRNs were effective. No behavioral concerns.

## 2023-08-23 NOTE — TELEPHONE ENCOUNTER
Labs ordered.     1. Screen for STD (sexually transmitted disease)    - Hepatitis B core antibody; Future  - Hepatitis B Surface Antibody; Future  - Hepatitis B surface antigen; Future  - Hepatitis C Screen Reflex to HCV RNA Quant and Genotype; Future  - HIV Antigen Antibody Combo Cascade; Future  - Treponema Abs w Reflex to RPR and Titer; Future  - NEISSERIA GONORRHOEA PCR; Future  - CHLAMYDIA TRACHOMATIS PCR; Future

## 2023-08-23 NOTE — TELEPHONE ENCOUNTER
Freshly admitted patient, transferring from [facility], is requesting basic battery of STD tests,   along with Hepatitis B and Hepatitis C testing.   Thank you.
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
## 2023-08-23 NOTE — PLAN OF CARE
Assessment/Intervention/Current Symtoms and Care Coordination:  Chart review and met with team, discussed pt progress, symptomology, and response to treatment.  Discussed the discharge plan and any potential impediments to discharge.    Tasks Completed:  - [facility] is being [facility]  - [place] from pt [facility] , Stefanie Che, asking for an update. Informed Stefanie that pt is being discharged today and provided contact information for [facility]. Added Stefanie's contact information to pt AVS.  - Pt was scheduled to go to [facility] at 10:30 AM. However, pt was napping and then asked to go through each of her belongings individually due to concern about possible missing items. Pt was agitated and anxious about her belongings as she lost items when she came from the [facility] to station 30. [facility] was informed by RN staff that [facility] will not hold her admission spot. [facility] spoke to Aaron at [facility] who reported that they will need to delay her admit to 2 PM. [facility] notified pt and pt was understanding and agreed to leave at 2 PM for [facility].  - Pt was discharged today.    Discharge Plan or Goal:  Pt is being discharged today to [facility]     Barriers to Discharge:  None     Referral Status:  Pt had CD evaluation this morning. Referrals submitted for [facility], [facility], and [facility]. [facility] Pt was accepted to [facility] and is being admitted there on 8/23/23.     Legal Status:  Pt is voluntary    Contacts:  Prepetition Screener ([facility])  Elaina Eubanks  Phone: [phone]  Email: [email or web address] :   NILO Bach  Phone: [phone]     Upcoming Meetings and Dates/Important Information and next steps:  N/A    Team Note Due:  Thursday

## 2023-08-23 NOTE — PLAN OF CARE
Problem: Sleep Disturbance  Goal: Adequate Sleep/Rest  Outcome: Progressing   Goal Outcome Evaluation:     ssumed care of patient at 1130 pm, in bed sleeping without any s/s distress.Breathing easy,regular non-labored.No behavioral issues noted.No signs of SI,HI,SIB or psychosis noted.15 minute safety checks maintained as per policy. Pt slept throughout the night without any difficulties, noted behaviors or complaints.Slept for 6.75 hrs

## 2023-08-23 NOTE — PROGRESS NOTES
Name: Madison Abrams  Date: 8/23/2023  Medical Record: [id number]    Envelope Number: [id number]  List of Contents (List each item separately in new row):   Aspercreme with 4% lidocaine, Ventolin HFA,  2 effervescent antacid & pain relief, Hydrocortisone cream   Admission:  I am responsible for any personal items that are not sent to the safe or pharmacy.  [facility] is not responsible for loss, theft or damage of any property in my possession.    Patient Signature:  ___________________________________________       Date/Time:__________________________    Staff Signature: __________________________________       Date/Time:__________________________    [facility] Staff person, if patient is unable/unwilling to sign:      __________________________________________________________       Date/Time: __________________________    **All medications are packaged by  staff and securely stored on [facility]. Medications left by patients at discharge will be packaged by LP staff and transported by LP staff to inpatient pharmacy for storage.**    Discharge:  [facility] has returned all of my personal belongings:    Patient Signature: ________________________________________     Date/Time: ____________________________________    Staff Signature: ______________________________________     Date/Time:_____________________________________

## 2023-08-23 NOTE — CARE PLAN
08/22/23 2036   Group Therapy Session   Group Attendance attended group session   Time Session Began 1700   Time Session Ended 1800   Total Time (minutes) 60   Total # Attendees 60   Group Type psychotherapeutic   Group Topic Covered coping skills/lifestyle management;emotions/expression;relaxation techniques   Group Session Detail CTC-Group members discussed mindfulness meditation worksheets and colored mandalas as meditation.   Patient Response/Contribution discussed personal experience with topic;cooperative with task;listened actively;expressed understanding of topic   Patient Participation Detail Patient presented to group was pleasant, engaged, and checked in feeling anxious and excited about discharging home.

## 2023-08-23 NOTE — PLAN OF CARE
Discharge orders are received.  Reviewed and discussed discharge instructions, follow up care, future appointments and medication administration reviewed .  Patient denies thoughts of SI, SIB or hallucinations.  Verbalized understanding of discharge instructions. Received personal belongings.  All forms are signed.  Patient to discharge to [facility] at 1400 .
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
## 2023-08-24 NOTE — PROGRESS NOTES
[facility]  Adult Substance Use Disorder Program  Discharge Summary         PATIENT  Madison Abrams   DATE OF BIRTH 1970   MRN [id number]   EVALUATION COUNSELOR NILO Lutz   PROGRAM [facility]   TREATMENT COUNSELOR NILO Mccarthy & NILO Paredes   REFERRAL SOURCE [facility]   ADMIT DATE 8/23/23   DATE OF LAST SESSION 8/23/23   DISCHARGE DATE 8/23/23   DISCHARGE STATUS Against Medical Advice     PRESENTING INFORMATION:   PER EHR : 8/10/23: Madison Abrams presents to the ED with family/friends. Patient is presenting to the ED for the following concerns: Depression, Suicidal ideation.   Factors that make the mental health crisis life threatening or complex are:  Recent loss, homelessness, suicidal ideation.     Patient reported being unable to stop using substances on her own, and with continued use she is experiencing an increase in mental health concerns.   Patient completed an ANNABELLA Assessment and met criteria for treatment. Patient was [facility].     ISSUES IN TREATMENT:    Patient did not attend programing after completing admission. Therefore, no Comprehensive Assessment Summary or Initial Treatment Plan were completed due patient  being at [facility] 8 hours in total.  Patient engaged in rude and disrespectful behaviors towards staff in the time she was present in treatment. Patient left Against Medical Advice.  If patient decides she wants to apply for readmission, she will need to wait a minimum of 30 days, and have a staff consult due to the behavior exhibited during the intake process.      Risk Ratings from ANNABELLA Assessment  by NILO Lutz on 8/21/23 are as follows:    D1: 0  D2: 1  D3: 2  D4: 1  D5: 4  D6: 3        Discharge Summary Completed By: NILO Mccarthy        This information has been disclosed to you from records protected by Federal  confidentiality rules (42 CFR part 2). The Federal rules prohibit you from making any further disclosure of this information unless further disclosure is expressly permitted by the written consent of the person to whom it pertains or as otherwise permitted by 42 CFR part 2. A general authorization for the release of medical or other information is NOT sufficient for this purpose. The Federal rules restrict any use of the information to criminally investigate or prosecute any alcohol or drug abuse patient.

## 2023-08-24 NOTE — PROGRESS NOTES
"          @1630, writer left to  patient for orientation. Patient, who was in her room, appeared upset. Patient complained that nobody had come to pick her up for a smoke at 4 o'clock in the afternoon. The writer apologized and gave patient the assurance that he would take her for a cigarette soon after orientation. patient responded, \"Why does it matter now, you guys already fucked up, so this is so fucking stupid, and I am mad .         Writer reiterated to patient that he will be able to take her for smoke after orientation which should be in the next 15 min. Patient continued being rude, swearing, and inappropriate. Patient came to the orientation room was disruptive making negative statement toward LP staffs and program. Patient was told by writer to discuss this after orientation, but patient disregarded writer's advice and carried on acting inappropriately.          Writer told patient that she will have time to go outside for a smoke and stressed that we do not accept this kind of behavior at LP. Patient left the room upset and slammed the door stating \"I am fucking done with this shift, I am not doing this. . Patient went to her room packed and came up to med room with all her belonging. Patient refused to sign AMA form and any of the other discharge forms and continued being rude to the med room staff. Patient left program @1730  "